# Patient Record
Sex: FEMALE | Race: BLACK OR AFRICAN AMERICAN
[De-identification: names, ages, dates, MRNs, and addresses within clinical notes are randomized per-mention and may not be internally consistent; named-entity substitution may affect disease eponyms.]

---

## 2017-10-05 ENCOUNTER — HOSPITAL ENCOUNTER (EMERGENCY)
Dept: HOSPITAL 61 - ER | Age: 37
LOS: 1 days | Discharge: HOME | End: 2017-10-06
Payer: COMMERCIAL

## 2017-10-05 VITALS — HEIGHT: 61 IN | WEIGHT: 198 LBS | BODY MASS INDEX: 37.38 KG/M2

## 2017-10-05 DIAGNOSIS — R10.9: Primary | ICD-10-CM

## 2017-10-05 DIAGNOSIS — K76.0: ICD-10-CM

## 2017-10-05 DIAGNOSIS — R30.0: ICD-10-CM

## 2017-10-05 DIAGNOSIS — E11.9: ICD-10-CM

## 2017-10-05 LAB
ALBUMIN SERPL-MCNC: 3.3 G/DL (ref 3.4–5)
ALBUMIN/GLOB SERPL: 0.8 {RATIO} (ref 1–1.7)
ALP SERPL-CCNC: 161 U/L (ref 46–116)
ALT SERPL-CCNC: 15 U/L (ref 14–59)
ANION GAP SERPL CALC-SCNC: 7 MMOL/L (ref 6–14)
AST SERPL-CCNC: 11 U/L (ref 15–37)
BACTERIA #/AREA URNS HPF: (no result) /HPF
BASOPHILS # BLD AUTO: 0.1 X10^3/UL (ref 0–0.2)
BASOPHILS NFR BLD: 1 % (ref 0–3)
BILIRUB SERPL-MCNC: 0.1 MG/DL (ref 0.2–1)
BILIRUB UR QL STRIP: NEGATIVE
BUN SERPL-MCNC: 7 MG/DL (ref 7–20)
BUN/CREAT SERPL: 9 (ref 6–20)
CALCIUM SERPL-MCNC: 8.7 MG/DL (ref 8.5–10.1)
CHLORIDE SERPL-SCNC: 100 MMOL/L (ref 98–107)
CO2 SERPL-SCNC: 27 MMOL/L (ref 21–32)
CREAT SERPL-MCNC: 0.8 MG/DL (ref 0.6–1)
EOSINOPHIL NFR BLD: 2 % (ref 0–3)
ERYTHROCYTE [DISTWIDTH] IN BLOOD BY AUTOMATED COUNT: 17.7 % (ref 11.5–14.5)
GFR SERPLBLD BASED ON 1.73 SQ M-ARVRAT: 97.1 ML/MIN
GLOBULIN SER-MCNC: 4.3 G/DL (ref 2.2–3.8)
GLUCOSE SERPL-MCNC: 386 MG/DL (ref 70–99)
GLUCOSE UR STRIP-MCNC: >=1000 MG/DL
HCT VFR BLD CALC: 31.4 % (ref 36–47)
HGB BLD-MCNC: 10.1 G/DL (ref 12–15.5)
LYMPHOCYTES # BLD: 2.2 X10^3/UL (ref 1–4.8)
LYMPHOCYTES NFR BLD AUTO: 28 % (ref 24–48)
MCH RBC QN AUTO: 25 PG (ref 25–35)
MCHC RBC AUTO-ENTMCNC: 32 G/DL (ref 31–37)
MCV RBC AUTO: 77 FL (ref 79–100)
MONOCYTES NFR BLD: 9 % (ref 0–9)
NEUTROPHILS NFR BLD AUTO: 61 % (ref 31–73)
NITRITE UR QL STRIP: NEGATIVE
PH UR STRIP: 5.5 [PH]
PLATELET # BLD AUTO: 267 X10^3/UL (ref 140–400)
POTASSIUM SERPL-SCNC: 3.9 MMOL/L (ref 3.5–5.1)
PROT SERPL-MCNC: 7.6 G/DL (ref 6.4–8.2)
PROT UR STRIP-MCNC: NEGATIVE MG/DL
RBC # BLD AUTO: 4.07 X10^6/UL (ref 3.5–5.4)
RBC #/AREA URNS HPF: 0 /HPF (ref 0–2)
SODIUM SERPL-SCNC: 134 MMOL/L (ref 136–145)
SP GR UR STRIP: >=1.03
SQUAMOUS #/AREA URNS LPF: (no result) /LPF
UROBILINOGEN UR-MCNC: 0.2 MG/DL
WBC # BLD AUTO: 8 X10^3/UL (ref 4–11)
WBC #/AREA URNS HPF: (no result) /HPF (ref 0–4)

## 2017-10-05 PROCEDURE — 85025 COMPLETE CBC W/AUTO DIFF WBC: CPT

## 2017-10-05 PROCEDURE — 81001 URINALYSIS AUTO W/SCOPE: CPT

## 2017-10-05 PROCEDURE — 36415 COLL VENOUS BLD VENIPUNCTURE: CPT

## 2017-10-05 PROCEDURE — 81025 URINE PREGNANCY TEST: CPT

## 2017-10-05 PROCEDURE — 76705 ECHO EXAM OF ABDOMEN: CPT

## 2017-10-05 PROCEDURE — 87086 URINE CULTURE/COLONY COUNT: CPT

## 2017-10-05 PROCEDURE — 99285 EMERGENCY DEPT VISIT HI MDM: CPT

## 2017-10-05 PROCEDURE — 80053 COMPREHEN METABOLIC PANEL: CPT

## 2017-10-05 PROCEDURE — 96374 THER/PROPH/DIAG INJ IV PUSH: CPT

## 2017-10-05 NOTE — PHYS DOC
Past Medical History


Past Medical History:  Bronchitis, Diabetes-Type II, Other


Additional Past Medical Histor:  HEART MURMUR, VIT D DEF,STRABISMUS


Past Surgical History:  , Tubal ligation, Other


Additional Past Surgical Histo:   x 3, bilateral eye surgery.


Alcohol Use:  None


Drug Use:  None





Adult General


Chief Complaint


Chief Complaint:  ABDOMINAL PAIN





HPI


HPI





Patient is a 37  year old female who presents with complaints of dysuria and 

mild abdominal pain in the middle of right side. Patient denies any fevers, 

chills, rashes, vomiting, diarrhea, vaginal discharge or vaginal bleeding. No 

history of trauma.





Review of Systems


Review of Systems





Constitutional: Denies fever or chills []


Eyes: Denies change in visual acuity, redness, or eye pain []


HENT: Denies nasal congestion or sore throat []


Respiratory: Denies cough or shortness of breath []


Cardiovascular: No chest pain


GI: Denies  nausea, vomiting, bloody stools or diarrhea. Yes right middle 

abdominal pain


: Denies dysuria or hematuria []


Musculoskeletal: Denies back pain or joint pain []


Integument: Denies rash or skin lesions []


Neurologic: Denies headache, focal weakness or sensory changes []





Current Medications


Current Medications





Current Medications








 Medications


  (Trade)  Dose


 Ordered  Sig/Robb  Start Time


 Stop Time Status Last Admin


Dose Admin


 


 Hyoscyamine


  (Anaspaz)  0.125 mg  PRN Q4HRS  PRN  10/5/17 22:15


    10/5/17 22:36


0.125 MG


 


 Ketorolac


 Tromethamine


  (Toradol)  15 mg  1X  ONCE  10/5/17 23:00


 10/5/17 23:01 DC 10/5/17 23:00


15 MG


 


 Naproxen


  (Naprosyn)  250 mg  1X  ONCE  10/5/17 22:30


 10/5/17 22:31 DC 10/5/17 22:36


250 MG











Allergies


Allergies





Allergies








Coded Allergies Type Severity Reaction Last Updated Verified


 


  No Known Drug Allergies    14 No











Physical Exam


Physical Exam





Constitutional: Well developed, well nourished, no acute distress, non-toxic 

appearance. []


HENT: Normocephalic, atraumatic, bilateral external ears normal, oropharynx dry

, no oral exudates, nose normal. []


Eyes:EOMI, conjunctiva normal, no discharge. [] 


Neck: Normal range of motion, no tenderness, trachea midline, no stridor. [] 


Cardiovascular:Heart rate regular rhythm, no murmur, equal pulses


Lungs & Thorax:  Bilateral breath sounds clear to auscultation, no tachypnea


Abdomen: Bowel sounds normal, soft, very mild tenderness middle or right 

abdomen without guarding or rebound, no masses, no pulsatile masses. [] 


Skin: Warm, dry, no erythema, no rash. [] 


Back: No tenderness, no CVA tenderness. [] 


Extremities: No tenderness, no cyanosis, no DVT, ROM intact, no edema. [] 


Neurologic: Alert and oriented X 3, normal motor function, , no focal deficits 

noted. []


Psychologic: Affect normal, judgement normal, mood normal. []





Current Patient Data


Vital Signs





 Vital Signs








  Date Time  Temp Pulse Resp B/P (MAP) Pulse Ox O2 Delivery O2 Flow Rate FiO2


 


10/6/17 00:29  57 16 99/52 (68) 95 Room Air  


 


10/5/17 21:25 98.6       





 98.6       








Lab Values





 Laboratory Tests








Test


  10/5/17


20:22 10/5/17


21:33 10/5/17


21:58


 


Urine Collection Type Unknown    


 


Urine Color Yellow    


 


Urine Clarity Clear    


 


Urine pH 5.5    


 


Urine Specific Gravity >=1.030    


 


Urine Protein


  Negative mg/dL


(NEG-TRACE) 


  


 


 


Urine Glucose (UA)


  >=1000 mg/dL


(NEG) 


  


 


 


Urine Ketones (Stick)


  Negative mg/dL


(NEG) 


  


 


 


Urine Blood


  Negative (NEG)


  


  


 


 


Urine Nitrite


  Negative (NEG)


  


  


 


 


Urine Bilirubin


  Negative (NEG)


  


  


 


 


Urine Urobilinogen Dipstick


  0.2 mg/dL (0.2


mg/dL) 


  


 


 


Urine Leukocyte Esterase


  Negative (NEG)


  


  


 


 


Urine RBC 0 /HPF (0-2)    


 


Urine WBC


  5-10 /HPF


(0-4) 


  


 


 


Urine Squamous Epithelial


Cells Mod /LPF  


  


  


 


 


Urine Bacteria


  Few /HPF


(0-FEW) 


  


 


 


POC Urine HCG, Qualitative


  


  Hcg negative


(Negative) 


 


 


White Blood Count


  


  


  8.0 x10^3/uL


(4.0-11.0)


 


Red Blood Count


  


  


  4.07 x10^6/uL


(3.50-5.40)


 


Hemoglobin


  


  


  10.1 g/dL


(12.0-15.5)  L


 


Hematocrit


  


  


  31.4 %


(36.0-47.0)  L


 


Mean Corpuscular Volume


  


  


  77 fL ()


L


 


Mean Corpuscular Hemoglobin   25 pg (25-35)  


 


Mean Corpuscular Hemoglobin


Concent 


  


  32 g/dL


(31-37)


 


Red Cell Distribution Width


  


  


  17.7 %


(11.5-14.5)  H


 


Platelet Count


  


  


  267 x10^3/uL


(140-400)


 


Neutrophils (%) (Auto)   61 % (31-73)  


 


Lymphocytes (%) (Auto)   28 % (24-48)  


 


Monocytes (%) (Auto)   9 % (0-9)  


 


Eosinophils (%) (Auto)   2 % (0-3)  


 


Basophils (%) (Auto)   1 % (0-3)  


 


Neutrophils # (Auto)


  


  


  4.9 x10^3uL


(1.8-7.7)


 


Lymphocytes # (Auto)


  


  


  2.2 x10^3/uL


(1.0-4.8)


 


Monocytes # (Auto)


  


  


  0.7 x10^3/uL


(0.0-1.1)


 


Eosinophils # (Auto)


  


  


  0.2 x10^3/uL


(0.0-0.7)


 


Basophils # (Auto)


  


  


  0.1 x10^3/uL


(0.0-0.2)


 


Sodium Level


  


  


  134 mmol/L


(136-145)  L


 


Potassium Level


  


  


  3.9 mmol/L


(3.5-5.1)


 


Chloride Level


  


  


  100 mmol/L


()


 


Carbon Dioxide Level


  


  


  27 mmol/L


(21-32)


 


Anion Gap   7 (6-14)  


 


Blood Urea Nitrogen


  


  


  7 mg/dL (7-20)


 


 


Creatinine


  


  


  0.8 mg/dL


(0.6-1.0)


 


Estimated GFR


(Cockcroft-Gault) 


  


  97.1  


 


 


BUN/Creatinine Ratio   9 (6-20)  


 


Glucose Level


  


  


  386 mg/dL


(70-99)  H


 


Calcium Level


  


  


  8.7 mg/dL


(8.5-10.1)


 


Total Bilirubin


  


  


  0.1 mg/dL


(0.2-1.0)  L


 


Aspartate Amino Transferase


(AST) 


  


  11 U/L (15-37)


L


 


Alanine Aminotransferase (ALT)


  


  


  15 U/L (14-59)


 


 


Alkaline Phosphatase


  


  


  161 U/L


()  H


 


Total Protein


  


  


  7.6 g/dL


(6.4-8.2)


 


Albumin


  


  


  3.3 g/dL


(3.4-5.0)  L


 


Albumin/Globulin Ratio


  


  


  0.8 (1.0-1.7)


L





 Laboratory Tests


10/5/17 21:58








 Laboratory Tests


10/5/17 21:58











EKG


EKG


[]





Radiology/Procedures


Radiology/Procedures


US: Fatty liver, no other acute findings[]


IMPRESSION:


 


Enlarged fatty liver. Contracted gallbladder with no choleliths, sludge or


wall thickening.





Course & Med Decision Making


Course & Med Decision Making


Pertinent Labs and Imaging studies reviewed. (See chart for details) desire all 

been discussed with the patient. Patient has been advised to follow-up with her 

doctor for recheck and reevaluation in 2 days.





[]





Dragon Disclaimer


Dragon Disclaimer


This electronic medical record was generated, in whole or in part, using a 

voice recognition dictation system.





Departure


Departure


Impression:  


 Primary Impression:  


 Abdominal pain


 Additional Impression:  


 Fatty liver


Disposition:   HOME, SELF-CARE


Condition:  STABLE


Referrals:  


CODIE STANFORD (PCP)


Follow-up for recheck and reevaluation 2 days


Patient Instructions:  Abdominal Pain (Nonspecific)


Scripts


Hyoscyamine Sulfate (LEVSIN) 0.125 Mg Tablet


1 TAB PO TID for 5 Days, #15 TAB 1 Refill


   Prov: SHANNON TRAVIS MD         10/6/17





Problem Qualifiers











SHANNON TRAVIS MD Oct 5, 2017 21:28

## 2017-10-06 VITALS — SYSTOLIC BLOOD PRESSURE: 112 MMHG | DIASTOLIC BLOOD PRESSURE: 53 MMHG

## 2017-10-06 NOTE — RAD
Abdominal ultrasound right upper quadrant:

 

Reason for examination: Right upper quadrant abdominal pain with nausea. 

Nothing by mouth 6.5 to 7 hours.

 

The pancreas is poorly visualized due to bowel gas. Visualized portion of 

the inferior vena cava shows no abnormality. The liver appears to be 

enlarged at 20.6 cm with fatty infiltration. There appears to be normal 

hepatopetal blood flow in the portal venous system. The right kidney 

measures 11.3 x 4.9 x 4.3 cm in greatest dimension and shows normal 

cortical medullary differentiation and good blood flow with no mass or 

hydronephrosis. The gallbladder is contracted with no cholelithiasis or 

wall thickening evident. Common bile duct is normal at 5.3 mm.

 

IMPRESSION:

 

Enlarged fatty liver. Contracted gallbladder with no choleliths, sludge or

wall thickening.

 

Electronically signed by: Jo-Ann Deras MD (10/6/2017 1:00 AM) [facility]

## 2018-02-12 ENCOUNTER — HOSPITAL ENCOUNTER (EMERGENCY)
Dept: HOSPITAL 61 - ER | Age: 38
Discharge: HOME | End: 2018-02-12
Payer: COMMERCIAL

## 2018-02-12 DIAGNOSIS — Z88.2: ICD-10-CM

## 2018-02-12 DIAGNOSIS — Z88.1: ICD-10-CM

## 2018-02-12 DIAGNOSIS — Z91.14: Primary | ICD-10-CM

## 2018-02-12 DIAGNOSIS — E11.65: ICD-10-CM

## 2018-02-12 DIAGNOSIS — F17.210: ICD-10-CM

## 2018-02-12 LAB
ACETONE: (no result)
ADD MAN DIFF?: NO
ALBUMIN SERPL-MCNC: 3.4 G/DL (ref 3.4–5)
ALBUMIN/GLOB SERPL: 0.7 {RATIO} (ref 1–1.7)
ALP SERPL-CCNC: 234 U/L (ref 46–116)
ALT (SGPT): 14 U/L (ref 14–59)
ANION GAP SERPL CALC-SCNC: 10 MMOL/L (ref 6–14)
AST SERPL-CCNC: 12 U/L (ref 15–37)
BASO #: 0.1 X10^3/UL (ref 0–0.2)
BASO %: 1 % (ref 0–3)
BLOOD UREA NITROGEN: 12 MG/DL (ref 7–20)
BUN/CREAT SERPL: 15 (ref 6–20)
CALCIUM: 9.6 MG/DL (ref 8.5–10.1)
CHLORIDE: 96 MMOL/L (ref 98–107)
CO2 SERPL-SCNC: 26 MMOL/L (ref 21–32)
CREAT SERPL-MCNC: 0.8 MG/DL (ref 0.6–1)
EOS #: 0.1 X10^3/UL (ref 0–0.7)
EOS %: 1 % (ref 0–3)
GFR SERPLBLD BASED ON 1.73 SQ M-ARVRAT: 97.1 ML/MIN
GLOBULIN SER-MCNC: 5.2 G/DL (ref 2.2–3.8)
GLUCOSE SERPL-MCNC: 542 MG/DL (ref 70–99)
HCG SERPL-ACNC: 5.8 X10^3/UL (ref 4–11)
HEMATOCRIT: 37 % (ref 36–47)
HEMOGLOBIN: 11.8 G/DL (ref 12–15.5)
LIPASE: 103 U/L (ref 73–393)
LYMPH #: 1.5 X10^3/UL (ref 1–4.8)
LYMPH %: 26 % (ref 24–48)
MEAN CORPUSCULAR HEMOGLOBIN: 25 PG (ref 25–35)
MEAN CORPUSCULAR HGB CONC: 32 G/DL (ref 31–37)
MEAN CORPUSCULAR VOLUME: 80 FL (ref 79–100)
MONO #: 0.5 X10^3/UL (ref 0–1.1)
MONO %: 8 % (ref 0–9)
NEG OBC SER: (no result)
NEUT #: 3.7 X10^3UL (ref 1.8–7.7)
NEUT %: 63 % (ref 31–73)
PLATELET COUNT: 289 X10^3/UL (ref 140–400)
POC GLUCOSE: 331 MG/DL (ref 70–99)
POC GLUCOSE: 397 MG/DL (ref 70–99)
POC GLUCOSE: 515 MG/DL (ref 70–99)
POS OBC SER: (no result)
POTASSIUM SERPL-SCNC: 4.3 MMOL/L (ref 3.5–5.1)
PREG TEST PT QUAL: NEGATIVE
RED BLOOD COUNT: 4.65 X10^6/UL (ref 3.5–5.4)
RED CELL DISTRIBUTION WIDTH: 17.4 % (ref 11.5–14.5)
SODIUM: 132 MMOL/L (ref 136–145)
TOTAL BILIRUBIN: 0.2 MG/DL (ref 0.2–1)
TOTAL PROTEIN: 8.6 G/DL (ref 6.4–8.2)
URINE HCG POC: (no result)

## 2018-02-12 PROCEDURE — 81025 URINE PREGNANCY TEST: CPT

## 2018-02-12 PROCEDURE — 82010 KETONE BODYS QUAN: CPT

## 2018-02-12 PROCEDURE — 80053 COMPREHEN METABOLIC PANEL: CPT

## 2018-02-12 PROCEDURE — 36415 COLL VENOUS BLD VENIPUNCTURE: CPT

## 2018-02-12 PROCEDURE — 96372 THER/PROPH/DIAG INJ SC/IM: CPT

## 2018-02-12 PROCEDURE — 99284 EMERGENCY DEPT VISIT MOD MDM: CPT

## 2018-02-12 PROCEDURE — 84703 CHORIONIC GONADOTROPIN ASSAY: CPT

## 2018-02-12 PROCEDURE — 96374 THER/PROPH/DIAG INJ IV PUSH: CPT

## 2018-02-12 PROCEDURE — 96361 HYDRATE IV INFUSION ADD-ON: CPT

## 2018-02-12 PROCEDURE — 83690 ASSAY OF LIPASE: CPT

## 2018-02-12 PROCEDURE — 82962 GLUCOSE BLOOD TEST: CPT

## 2018-02-12 PROCEDURE — 85025 COMPLETE CBC W/AUTO DIFF WBC: CPT

## 2018-02-12 RX ADMIN — BACITRACIN 1 MLS/HR: 5000 INJECTION, POWDER, FOR SOLUTION INTRAMUSCULAR at 14:14

## 2018-02-12 RX ADMIN — BACITRACIN 1 MLS/HR: 5000 INJECTION, POWDER, FOR SOLUTION INTRAMUSCULAR at 14:15

## 2018-02-12 RX ADMIN — BACITRACIN 1 MLS/HR: 5000 INJECTION, POWDER, FOR SOLUTION INTRAMUSCULAR at 12:21

## 2018-02-12 RX ADMIN — FAMOTIDINE 1 MG: 10 INJECTION, SOLUTION INTRAVENOUS at 12:20

## 2018-03-20 ENCOUNTER — HOSPITAL ENCOUNTER (EMERGENCY)
Dept: HOSPITAL 61 - ER | Age: 38
Discharge: HOME | End: 2018-03-20
Payer: COMMERCIAL

## 2018-03-20 DIAGNOSIS — Z98.51: ICD-10-CM

## 2018-03-20 DIAGNOSIS — Z90.49: ICD-10-CM

## 2018-03-20 DIAGNOSIS — E11.9: ICD-10-CM

## 2018-03-20 DIAGNOSIS — R09.81: ICD-10-CM

## 2018-03-20 DIAGNOSIS — R05: ICD-10-CM

## 2018-03-20 DIAGNOSIS — Z88.2: ICD-10-CM

## 2018-03-20 DIAGNOSIS — K21.9: ICD-10-CM

## 2018-03-20 DIAGNOSIS — R07.89: Primary | ICD-10-CM

## 2018-03-20 DIAGNOSIS — Z88.1: ICD-10-CM

## 2018-03-20 LAB
ADD MAN DIFF?: NO
ALBUMIN SERPL-MCNC: 2.9 G/DL (ref 3.4–5)
ALBUMIN/GLOB SERPL: 0.7 {RATIO} (ref 1–1.7)
ALP SERPL-CCNC: 152 U/L (ref 46–116)
ALT (SGPT): 16 U/L (ref 14–59)
ANION GAP SERPL CALC-SCNC: 9 MMOL/L (ref 6–14)
AST SERPL-CCNC: 19 U/L (ref 15–37)
BASO #: 0 X10^3/UL (ref 0–0.2)
BASO %: 1 % (ref 0–3)
BLOOD UREA NITROGEN: 9 MG/DL (ref 7–20)
BUN/CREAT SERPL: 13 (ref 6–20)
CALCIUM: 8.5 MG/DL (ref 8.5–10.1)
CHLORIDE: 100 MMOL/L (ref 98–107)
CO2 SERPL-SCNC: 28 MMOL/L (ref 21–32)
CREAT SERPL-MCNC: 0.7 MG/DL (ref 0.6–1)
EOS #: 0.1 X10^3/UL (ref 0–0.7)
EOS %: 2 % (ref 0–3)
GFR SERPLBLD BASED ON 1.73 SQ M-ARVRAT: 112.7 ML/MIN
GLOBULIN SER-MCNC: 4.3 G/DL (ref 2.2–3.8)
GLUCOSE SERPL-MCNC: 326 MG/DL (ref 70–99)
HCG SERPL-ACNC: 5.1 X10^3/UL (ref 4–11)
HEMATOCRIT: 32 % (ref 36–47)
HEMOGLOBIN: 10.4 G/DL (ref 12–15.5)
LYMPH #: 1.6 X10^3/UL (ref 1–4.8)
LYMPH %: 30 % (ref 24–48)
MEAN CORPUSCULAR HEMOGLOBIN: 26 PG (ref 25–35)
MEAN CORPUSCULAR HGB CONC: 32 G/DL (ref 31–37)
MEAN CORPUSCULAR VOLUME: 79 FL (ref 79–100)
MONO #: 0.5 X10^3/UL (ref 0–1.1)
MONO %: 10 % (ref 0–9)
NEG OBC SER: (no result)
NEUT #: 2.9 X10^3UL (ref 1.8–7.7)
NEUT %: 57 % (ref 31–73)
PLATELET COUNT: 279 X10^3/UL (ref 140–400)
POS OBC SER: (no result)
POTASSIUM SERPL-SCNC: 3.6 MMOL/L (ref 3.5–5.1)
PREG TEST PT QUAL: NEGATIVE
RED BLOOD COUNT: 4.04 X10^6/UL (ref 3.5–5.4)
RED CELL DISTRIBUTION WIDTH: 18.5 % (ref 11.5–14.5)
SODIUM: 137 MMOL/L (ref 136–145)
TOTAL BILIRUBIN: 0.2 MG/DL (ref 0.2–1)
TOTAL PROTEIN: 7.2 G/DL (ref 6.4–8.2)
TROPONINI: < 0.017 NG/ML (ref 0–0.06)
URINE HCG POC: (no result)

## 2018-03-20 PROCEDURE — 71045 X-RAY EXAM CHEST 1 VIEW: CPT

## 2018-03-20 PROCEDURE — 36415 COLL VENOUS BLD VENIPUNCTURE: CPT

## 2018-03-20 PROCEDURE — 99285 EMERGENCY DEPT VISIT HI MDM: CPT

## 2018-03-20 PROCEDURE — 80053 COMPREHEN METABOLIC PANEL: CPT

## 2018-03-20 PROCEDURE — 93005 ELECTROCARDIOGRAM TRACING: CPT

## 2018-03-20 PROCEDURE — 84703 CHORIONIC GONADOTROPIN ASSAY: CPT

## 2018-03-20 PROCEDURE — 81025 URINE PREGNANCY TEST: CPT

## 2018-03-20 PROCEDURE — 85025 COMPLETE CBC W/AUTO DIFF WBC: CPT

## 2018-03-20 PROCEDURE — 84484 ASSAY OF TROPONIN QUANT: CPT

## 2018-03-20 RX ADMIN — FAMOTIDINE 1 MG: 20 TABLET ORAL at 11:03

## 2018-03-20 RX ADMIN — Medication 1 ML: at 11:03

## 2019-02-24 ENCOUNTER — HOSPITAL ENCOUNTER (EMERGENCY)
Dept: HOSPITAL 61 - ER | Age: 39
Discharge: HOME | End: 2019-02-24
Payer: SELF-PAY

## 2019-02-24 VITALS — DIASTOLIC BLOOD PRESSURE: 87 MMHG | SYSTOLIC BLOOD PRESSURE: 136 MMHG

## 2019-02-24 VITALS — WEIGHT: 177 LBS | BODY MASS INDEX: 32.57 KG/M2 | HEIGHT: 62 IN

## 2019-02-24 DIAGNOSIS — Z88.2: ICD-10-CM

## 2019-02-24 DIAGNOSIS — N39.0: Primary | ICD-10-CM

## 2019-02-24 DIAGNOSIS — E11.9: ICD-10-CM

## 2019-02-24 DIAGNOSIS — Z88.1: ICD-10-CM

## 2019-02-24 DIAGNOSIS — Z98.51: ICD-10-CM

## 2019-02-24 LAB
APTT PPP: (no result) S
APTT PPP: YELLOW S
BACTERIA #/AREA URNS HPF: (no result) /HPF
BACTERIA #/AREA URNS HPF: (no result) /HPF
BILIRUB UR QL STRIP: (no result)
BILIRUB UR QL STRIP: NEGATIVE
FIBRINOGEN PPP-MCNC: (no result) MG/DL
FIBRINOGEN PPP-MCNC: (no result) MG/DL
NITRITE UR QL STRIP: (no result)
NITRITE UR QL STRIP: NEGATIVE
PH UR STRIP: (no result) [PH]
PH UR STRIP: 6 [PH]
PROT UR STRIP-MCNC: (no result) MG/DL
PROT UR STRIP-MCNC: 100 MG/DL
RBC #/AREA URNS HPF: (no result) /HPF (ref 0–2)
SQUAMOUS #/AREA URNS LPF: (no result) /LPF
SQUAMOUS #/AREA URNS LPF: (no result) /LPF
U PREG PATIENT: NEGATIVE
UROBILINOGEN UR-MCNC: (no result) MG/DL
UROBILINOGEN UR-MCNC: 1 MG/DL
WBC #/AREA URNS HPF: (no result) /HPF (ref 0–4)
WBC #/AREA URNS HPF: (no result) /HPF (ref 0–4)

## 2019-02-24 PROCEDURE — 87086 URINE CULTURE/COLONY COUNT: CPT

## 2019-02-24 PROCEDURE — 99283 EMERGENCY DEPT VISIT LOW MDM: CPT

## 2019-02-24 PROCEDURE — 81001 URINALYSIS AUTO W/SCOPE: CPT

## 2019-02-24 PROCEDURE — 81025 URINE PREGNANCY TEST: CPT

## 2019-02-24 PROCEDURE — 87186 SC STD MICRODIL/AGAR DIL: CPT

## 2019-02-24 NOTE — PHYS DOC
Past Medical History


Past Medical History:  Bronchitis, Depression, Diabetes-Type II


Additional Past Medical Histor:  vitamin D deficency


Past Surgical History:  , Tubal ligation


Additional Past Surgical Histo:  eye surgery (strabismus correction)


Alcohol Use:  None


Drug Use:  None





Adult General


Chief Complaint


Chief Complaint:  PAIN ON URINATION





HPI


HPI





Patient is a 38  year old female who presents with dysuria, urinary frequency 

and urgency the past 24 hours. Patient was reports hematuria she is on her 

menstrual period. No flank pain, and no fever chills, nausea vomiting or 

sweats. No other acute symptoms or complaints. History of recurrent urinary 

tract infections. []





Review of Systems


Review of Systems


Symptoms as per history of present illness. All other review symptoms are 

negative.





All other systems were reviewed and found to be within normal limits, except as 

documented in this note.





Current Medications


Current Medications





Current Medications








 Medications


  (Trade)  Dose


 Ordered  Sig/Robb  Start Time


 Stop Time Status Last Admin


Dose Admin


 


 Nitrofurantoin


 Macrocrystals


  (Macrobid)  100 mg  1X  ONCE  19 02:00


 19 02:01   





 


 Phenazopyridine


 HCl


  (Pyridium)  200 mg  1X  ONCE  19 01:30


 19 01:31 DC 19 01:30


200 MG











Allergies


Allergies





Allergies








Coded Allergies Type Severity Reaction Last Updated Verified


 


  sulfamethoxazole Adverse Reaction Intermediate nausea and vomiting 18 Yes


 


  trimethoprim Adverse Reaction Intermediate nausea and vomiting 18 Yes











Physical Exam


Physical Exam





Constitutional: Well developed, well nourished, no acute distress, non-toxic 

appearance. []


HENT: Normocephalic, atraumatic, bilateral external ears normal, oropharynx 

moist, nose normal. []


Eyes: PERRLA, EOMI, conjunctiva normal, no discharge. [] 


Lungs & Thorax:  Bilateral breath sounds clear to auscultation []


Abdomen: Bowel sounds normal, soft, suprapubic pain, tenderness. [] 


Skin: Warm, dry, no erythema, no rash. [] 


Back: No tenderness, no CVA tenderness. [] 


Extremities: No tenderness, no cyanosis, no clubbing, ROM intact, no edema. [] 


Neurologic: Alert and oriented X 3. []


Psychologic: Affect normal, judgement normal, mood normal. []





Current Patient Data


Vital Signs





 Vital Signs








  Date Time  Temp Pulse Resp B/P (MAP) Pulse Ox O2 Delivery O2 Flow Rate FiO2


 


19 00:28 97.4 96 18 136/87 (103) 98 Room Air  





 97.4       








Lab Values





 Laboratory Tests








Test


 19


00:15 19


00:58


 


Urine Collection Type Unknown   U cath  


 


Urine Color Red   Yellow  


 


Urine Clarity Turbid   Cloudy  


 


Urine pH    6.0  


 


Urine Specific Gravity >=1.030   >=1.030  


 


Urine Protein


 mg/dL


(NEG-TRACE) 100 mg/dL


(NEG-TRACE)


 


Urine Glucose (UA)


  mg/dL (NEG)  


 >=1000 mg/dL


(NEG)


 


Urine Ketones (Stick)


  mg/dL (NEG)  


 Negative mg/dL


(NEG)


 


Urine Blood  (NEG)   Large (NEG)  


 


Urine Nitrite


  (NEG)  


 Negative (NEG)





 


Urine Bilirubin


  (NEG)  


 Negative (NEG)





 


Urine Urobilinogen Dipstick


 mg/dL (0.2


mg/dL) 1.0 mg/dL (0.2


mg/dL)


 


Urine Leukocyte Esterase  (NEG)   Small (NEG)  


 


Urine RBC


 Tntc /HPF


(0-2) 11-20 /HPF


(0-2)


 


Urine WBC


 20-40 /HPF


(0-4) 20-40 /HPF


(0-4)


 


Urine Squamous Epithelial


Cells Few /LPF  


 Few /LPF  





 


Urine Bacteria


 Few /HPF


(0-FEW) Few /HPF


(0-FEW)


 


Urine Pregnancy Test


 Negative (NEG)


 





 


Urine Mucus  Slight /LPF  











EKG


EKG


[]





Radiology/Procedures


Radiology/Procedures


[]





Course & Med Decision Making


Course & Med Decision Making


Pertinent Labs and Imaging studies reviewed. (See chart for details)





[First dose of antibiotics given. Recommend PCP follow-up]





Dragon Disclaimer


Dragon Disclaimer


This electronic medical record was generated, in whole or in part, using a 

voice recognition dictation system.





Departure


Departure


Impression:  


 Primary Impression:  


 UTI (urinary tract infection)


Disposition:  01 HOME, SELF-CARE


Condition:  GOOD


Patient Instructions:  Urinary Tract Infection, Easy-to-Read





Additional Instructions:  


Please take antibiotics as directed and Pyridium for pain relief. Follow-up 

with your PCP in 2-3 days for urine culture results. Return to the ED if new or 

worsening symptoms.


Scripts


Nitrofurantoin Macrocrystal (MACRODANTIN) 100 Mg Capsule


1 CAP PO BID, #20 CAP


   Prov: MARKUS LYN DO         19 


Phenazopyridine Hcl (PYRIDIUM) 200 Mg Tablet


200 MG PO TID, #6 TAB


   Prov: MARKUS LYN DO         19











MARKUS LYN DO 2019 01:56

## 2019-06-02 ENCOUNTER — HOSPITAL ENCOUNTER (EMERGENCY)
Dept: HOSPITAL 61 - ER | Age: 39
LOS: 1 days | Discharge: HOME | End: 2019-06-03
Payer: COMMERCIAL

## 2019-06-02 VITALS — HEIGHT: 62 IN | BODY MASS INDEX: 30.91 KG/M2 | WEIGHT: 168 LBS

## 2019-06-02 DIAGNOSIS — N39.0: Primary | ICD-10-CM

## 2019-06-02 DIAGNOSIS — Z88.1: ICD-10-CM

## 2019-06-02 DIAGNOSIS — Z98.51: ICD-10-CM

## 2019-06-02 DIAGNOSIS — Z98.890: ICD-10-CM

## 2019-06-02 DIAGNOSIS — F32.9: ICD-10-CM

## 2019-06-02 DIAGNOSIS — E11.9: ICD-10-CM

## 2019-06-02 DIAGNOSIS — R20.0: ICD-10-CM

## 2019-06-02 DIAGNOSIS — Z88.2: ICD-10-CM

## 2019-06-02 PROCEDURE — 85025 COMPLETE CBC W/AUTO DIFF WBC: CPT

## 2019-06-02 PROCEDURE — 70450 CT HEAD/BRAIN W/O DYE: CPT

## 2019-06-02 PROCEDURE — 81025 URINE PREGNANCY TEST: CPT

## 2019-06-02 PROCEDURE — 80053 COMPREHEN METABOLIC PANEL: CPT

## 2019-06-02 PROCEDURE — 81001 URINALYSIS AUTO W/SCOPE: CPT

## 2019-06-02 PROCEDURE — 87186 SC STD MICRODIL/AGAR DIL: CPT

## 2019-06-02 PROCEDURE — 36415 COLL VENOUS BLD VENIPUNCTURE: CPT

## 2019-06-02 PROCEDURE — 87086 URINE CULTURE/COLONY COUNT: CPT

## 2019-06-02 PROCEDURE — 93005 ELECTROCARDIOGRAM TRACING: CPT

## 2019-06-02 PROCEDURE — 84484 ASSAY OF TROPONIN QUANT: CPT

## 2019-06-02 PROCEDURE — 83735 ASSAY OF MAGNESIUM: CPT

## 2019-06-03 VITALS — DIASTOLIC BLOOD PRESSURE: 55 MMHG | SYSTOLIC BLOOD PRESSURE: 107 MMHG

## 2019-06-03 LAB
ALBUMIN SERPL-MCNC: 3.5 G/DL (ref 3.4–5)
ALBUMIN/GLOB SERPL: 0.8 {RATIO} (ref 1–1.7)
ALP SERPL-CCNC: 174 U/L (ref 46–116)
ALT SERPL-CCNC: 15 U/L (ref 14–59)
ANION GAP SERPL CALC-SCNC: 8 MMOL/L (ref 6–14)
APTT PPP: YELLOW S
AST SERPL-CCNC: 14 U/L (ref 15–37)
BACTERIA #/AREA URNS HPF: (no result) /HPF
BASOPHILS # BLD AUTO: 0.1 X10^3/UL (ref 0–0.2)
BASOPHILS NFR BLD: 1 % (ref 0–3)
BILIRUB SERPL-MCNC: 0.2 MG/DL (ref 0.2–1)
BILIRUB UR QL STRIP: NEGATIVE
BUN SERPL-MCNC: 8 MG/DL (ref 7–20)
BUN/CREAT SERPL: 11 (ref 6–20)
CALCIUM SERPL-MCNC: 8.9 MG/DL (ref 8.5–10.1)
CHLORIDE SERPL-SCNC: 97 MMOL/L (ref 98–107)
CO2 SERPL-SCNC: 29 MMOL/L (ref 21–32)
CREAT SERPL-MCNC: 0.7 MG/DL (ref 0.6–1)
EOSINOPHIL NFR BLD: 0.1 X10^3/UL (ref 0–0.7)
EOSINOPHIL NFR BLD: 1 % (ref 0–3)
ERYTHROCYTE [DISTWIDTH] IN BLOOD BY AUTOMATED COUNT: 15.7 % (ref 11.5–14.5)
FIBRINOGEN PPP-MCNC: CLEAR MG/DL
GFR SERPLBLD BASED ON 1.73 SQ M-ARVRAT: 112.1 ML/MIN
GLOBULIN SER-MCNC: 4.4 G/DL (ref 2.2–3.8)
GLUCOSE SERPL-MCNC: 361 MG/DL (ref 70–99)
HCT VFR BLD CALC: 33.3 % (ref 36–47)
HGB BLD-MCNC: 10.6 G/DL (ref 12–15.5)
LYMPHOCYTES # BLD: 2.6 X10^3/UL (ref 1–4.8)
LYMPHOCYTES NFR BLD AUTO: 35 % (ref 24–48)
MAGNESIUM SERPL-MCNC: 1.9 MG/DL (ref 1.8–2.4)
MCH RBC QN AUTO: 24 PG (ref 25–35)
MCHC RBC AUTO-ENTMCNC: 32 G/DL (ref 31–37)
MCV RBC AUTO: 76 FL (ref 79–100)
MONO #: 1.2 X10^3/UL (ref 0–1.1)
MONOCYTES NFR BLD: 16 % (ref 0–9)
NEUT #: 3.7 X10^3UL (ref 1.8–7.7)
NEUTROPHILS NFR BLD AUTO: 48 % (ref 31–73)
NITRITE UR QL STRIP: POSITIVE
PH UR STRIP: 7 [PH]
PLATELET # BLD AUTO: 243 X10^3/UL (ref 140–400)
POTASSIUM SERPL-SCNC: 4.3 MMOL/L (ref 3.5–5.1)
PROT SERPL-MCNC: 7.9 G/DL (ref 6.4–8.2)
PROT UR STRIP-MCNC: NEGATIVE MG/DL
RBC # BLD AUTO: 4.36 X10^6/UL (ref 3.5–5.4)
RBC #/AREA URNS HPF: (no result) /HPF (ref 0–2)
SODIUM SERPL-SCNC: 134 MMOL/L (ref 136–145)
SQUAMOUS #/AREA URNS LPF: (no result) /LPF
UROBILINOGEN UR-MCNC: 1 MG/DL
WBC # BLD AUTO: 7.7 X10^3/UL (ref 4–11)
WBC #/AREA URNS HPF: (no result) /HPF (ref 0–4)

## 2019-06-03 NOTE — RAD
Cristal Townsendren

INDICATION: Numbness of scalp and face

 

COMPARISON: None.

 

TECHNIQUE: 

 

Axial CT images obtained through the head without intravenous contrast.

 

One or more of the following individualized dose reduction techniques were

utilized for this examination:  1. Automated exposure control;  2. 

Adjustment of the mA and/or kV according to patient size;  3. Use of 

iterative reconstruction technique.

 

FINDINGS:

 

No intracranial hemorrhage.

No midline shift.  Basal cisterns patent.

Ventricles and sulci are unremarkable.

No acute osseous abnormality.

Orbits and paranasal sinuses unremarkable.

 

IMPRESSION:

 

1.   No acute intracranial hemorrhage.

 

Electronically signed by: Kaushik Man MD (6/3/2019 11:50 PM) [facility]

## 2019-06-03 NOTE — PHYS DOC
Past Medical History


Past Medical History:  Bronchitis, Depression, Diabetes-Type II


Additional Past Medical Histor:  vitamin D deficency


Past Surgical History:  , Tubal ligation


Additional Past Surgical Histo:  eye surgery (strabismus correction)


Alcohol Use:  None


Drug Use:  None





Adult General


Chief Complaint


Chief Complaint:  MULTIPLE COMPLAINTS





HPI


HPI





Patient is a 39  year old f with cc of scalp numnbess. right side x one day. no 

trauma noted.


also has intermittent numbness of the b/l legs and hands comes and goes thinks 

that is from diabetes


no headche no speech problem, no facial symptoms.


no fever.


tries to be comlpiant with her medications


symptoms modeating nonradiating.





Review of Systems


Review of Systems





Constitutional: Denies fever or chills []


Eyes: Denies change in visual acuity, redness, or eye pain []


HENT: Denies nasal congestion or sore throat []


Respiratory: Denies cough or shortness of breath []





Musculoskeletal: Denies back pain or joint pain []








All other systems were reviewed and found to be within normal limits, except as 

documented in this note.





Allergies


Allergies





Allergies








Coded Allergies Type Severity Reaction Last Updated Verified


 


  sulfamethoxazole Adverse Reaction Intermediate nausea and vomiting 18 Yes


 


  trimethoprim Adverse Reaction Intermediate nausea and vomiting 18 Yes











Physical Exam


Physical Exam





Constitutional: Well developed, well nourished, no acute distress, non-toxic 

appearance. []


HENT: Normocephalic, atraumatic, bilateral external ears normal, oropharynx 

moist, no oral exudates, nose normal. []


Eyes: PERRLA, EOMI, conjunctiva normal, no discharge. [] 


Neck: Normal range of motion, no tenderness, supple, no stridor. [] 


Cardiovascular:Heart rate regular rhythm, no murmur []


Lungs & Thorax:  Bilateral breath sounds clear to auscultation []


Abdomen: Bowel sounds normal, soft, no tenderness, no masses, no pulsatile 

masses. [] 


Skin: Warm, dry, no erythema, no rash. [] no obvious scalp lesion noted


Back: No tenderness, no CVA tenderness. [] 


Extremities: No tenderness, no cyanosis, no clubbing, ROM intact, no edema. [] 


Neurologic: Alert and oriented X 3, normal motor function, normal sensory fu

nction, no focal deficits noted. [] sensation and motor function intact, cn's 

intact


Psychologic: Affect normal, judgement normal, mood normal. []





Current Patient Data


Vital Signs





                                   Vital Signs








  Date Time  Temp Pulse Resp B/P (MAP) Pulse Ox O2 Delivery O2 Flow Rate FiO2


 


6/3/19 00:34  78 18 115/70 (85) 99 Room Air  


 


19 23:50 98.4       





 98.4       








Lab Values





                                Laboratory Tests








Test


 6/3/19


00:01 6/3/19


00:20


 


Urine Collection Type Unknown   


 


Urine Color Yellow   


 


Urine Clarity Clear   


 


Urine pH 7.0   


 


Urine Specific Gravity >=1.030   


 


Urine Protein


 Negative mg/dL


(NEG-TRACE) 





 


Urine Glucose (UA)


 >=1000 mg/dL


(NEG) 





 


Urine Ketones (Stick)


 Negative mg/dL


(NEG) 





 


Urine Blood


 Negative (NEG)


 





 


Urine Nitrite


 Positive (NEG)


 





 


Urine Bilirubin


 Negative (NEG)


 





 


Urine Urobilinogen Dipstick


 1.0 mg/dL (0.2


mg/dL) 





 


Urine Leukocyte Esterase Small (NEG)   


 


Urine RBC


 Occ /HPF (0-2)


 





 


Urine WBC


 Tntc /HPF


(0-4) 





 


Urine Squamous Epithelial


Cells Mod /LPF  


 





 


Urine Bacteria


 Many /HPF


(0-FEW) 





 


Urine Mucus Slight /LPF   


 


POC Urine HCG, Qualitative


 Hcg negative


(Negative) 





 


White Blood Count


 


 7.7 x10^3/uL


(4.0-11.0)


 


Red Blood Count


 


 4.36 x10^6/uL


(3.50-5.40)


 


Hemoglobin


 


 10.6 g/dL


(12.0-15.5)  L


 


Hematocrit


 


 33.3 %


(36.0-47.0)  L


 


Mean Corpuscular Volume


 


 76 fL ()


L


 


Mean Corpuscular Hemoglobin


 


 24 pg (25-35)


L


 


Mean Corpuscular Hemoglobin


Concent 


 32 g/dL


(31-37)


 


Red Cell Distribution Width


 


 15.7 %


(11.5-14.5)  H


 


Platelet Count


 


 243 x10^3/uL


(140-400)


 


Neutrophils (%) (Auto)  48 % (31-73)  


 


Lymphocytes (%) (Auto)  35 % (24-48)  


 


Monocytes (%) (Auto)  16 % (0-9)  H


 


Eosinophils (%) (Auto)  1 % (0-3)  


 


Basophils (%) (Auto)  1 % (0-3)  


 


Neutrophils # (Auto)


 


 3.7 x10^3uL


(1.8-7.7)


 


Lymphocytes # (Auto)


 


 2.6 x10^3/uL


(1.0-4.8)


 


Monocytes # (Auto)


 


 1.2 x10^3/uL


(0.0-1.1)  H


 


Eosinophils # (Auto)


 


 0.1 x10^3/uL


(0.0-0.7)


 


Basophils # (Auto)


 


 0.1 x10^3/uL


(0.0-0.2)


 


Sodium Level


 


 134 mmol/L


(136-145)  L


 


Potassium Level


 


 4.3 mmol/L


(3.5-5.1)


 


Chloride Level


 


 97 mmol/L


()  L


 


Carbon Dioxide Level


 


 29 mmol/L


(21-32)


 


Anion Gap  8 (6-14)  


 


Blood Urea Nitrogen


 


 8 mg/dL (7-20)





 


Creatinine


 


 0.7 mg/dL


(0.6-1.0)


 


Estimated GFR


(Cockcroft-Gault) 


 112.1  





 


BUN/Creatinine Ratio  11 (6-20)  


 


Glucose Level


 


 361 mg/dL


(70-99)  H


 


Calcium Level


 


 8.9 mg/dL


(8.5-10.1)


 


Magnesium Level


 


 1.9 mg/dL


(1.8-2.4)


 


Total Bilirubin


 


 0.2 mg/dL


(0.2-1.0)


 


Aspartate Amino Transferase


(AST) 


 14 U/L (15-37)


L


 


Alanine Aminotransferase (ALT)


 


 15 U/L (14-59)





 


Alkaline Phosphatase


 


 174 U/L


()  H


 


Troponin I Quantitative


 


 < 0.017 ng/mL


(0.000-0.055)


 


Total Protein


 


 7.9 g/dL


(6.4-8.2)


 


Albumin


 


 3.5 g/dL


(3.4-5.0)


 


Albumin/Globulin Ratio


 


 0.8 (1.0-1.7)


L





                                Laboratory Tests


6/3/19 00:20








                                Laboratory Tests


6/3/19 00:20














EKG


EKG


[]





Radiology/Procedures


Radiology/Procedures


[]


Impressions:


wet read head ct neg acute





Course & Med Decision Making


Course & Med Decision Making


Pertinent Labs and Imaging studies reviewed. (See chart for details)





[]38 yo f diabetic p/w isolated scalp  numnbess. stroke scale 0 ct head neg


pt reassured


abx for possible uti


counseled on good bg control.


no evidnece of cva clinically





Dragon Disclaimer


Dragon Disclaimer


This electronic medical record was generated, in whole or in part, using a voice

 recognition dictation system.





Departure


Departure


Impression:  


   Primary Impression:  


   Urinary tract infection


Disposition:  01 HOME, SELF-CARE


Condition:  STABLE


Patient Instructions:  Urinary Tract Infection


Scripts


Cephalexin (CEPHALEXIN) 500 Mg Capsule


1 CAP PO QID, #40 CAP


   Prov: LYUDMILA WEINER MD         6/3/19











LYUDMILA WEINER MD             Ashvin 3, 2019 03:14

## 2019-06-03 NOTE — EKG
[facility]

              [address] [place] [address]

Test Date:    2019               Test Time:    00:35:47

Pat Name:     ALEK MONTESINOS         Department:   

Patient ID:   [id number]           Room:          

Gender:       F                        Technician:   

:          [id number]               Requested By: LYUDMILA WEINER

Order Number: [id number]           Reading MD:     

                                 Measurements

Intervals                              Axis          

Rate:         76                       P:            28

VA:           180                      QRS:          9

QRSD:         86                       T:            26

QT:           374                                    

QTc:          425                                    

                           Interpretive Statements

SINUS RHYTHM

NORMAL ECG

No previous ECG available for comparison

## 2019-08-05 ENCOUNTER — HOSPITAL ENCOUNTER (EMERGENCY)
Dept: HOSPITAL 61 - ER | Age: 39
Discharge: HOME | End: 2019-08-05
Payer: COMMERCIAL

## 2019-08-05 VITALS — WEIGHT: 163 LBS | HEIGHT: 62 IN | BODY MASS INDEX: 30 KG/M2

## 2019-08-05 VITALS — DIASTOLIC BLOOD PRESSURE: 52 MMHG | SYSTOLIC BLOOD PRESSURE: 106 MMHG

## 2019-08-05 DIAGNOSIS — E11.9: ICD-10-CM

## 2019-08-05 DIAGNOSIS — Z88.1: ICD-10-CM

## 2019-08-05 DIAGNOSIS — R07.81: ICD-10-CM

## 2019-08-05 DIAGNOSIS — R11.2: ICD-10-CM

## 2019-08-05 DIAGNOSIS — N39.0: Primary | ICD-10-CM

## 2019-08-05 DIAGNOSIS — Z88.2: ICD-10-CM

## 2019-08-05 LAB
ALBUMIN SERPL-MCNC: 3.3 G/DL (ref 3.4–5)
ALBUMIN/GLOB SERPL: 0.7 {RATIO} (ref 1–1.7)
ALP SERPL-CCNC: 145 U/L (ref 46–116)
ALT SERPL-CCNC: 18 U/L (ref 14–59)
AMPHETAMINE/METHAMPHETAMINE: (no result)
ANION GAP SERPL CALC-SCNC: 14 MMOL/L (ref 6–14)
APTT PPP: YELLOW S
AST SERPL-CCNC: 16 U/L (ref 15–37)
BACTERIA #/AREA URNS HPF: (no result) /HPF
BARBITURATES UR-MCNC: (no result) UG/ML
BASOPHILS # BLD AUTO: 0 X10^3/UL (ref 0–0.2)
BASOPHILS NFR BLD: 1 % (ref 0–3)
BENZODIAZ UR-MCNC: (no result) UG/L
BILIRUB SERPL-MCNC: 0.3 MG/DL (ref 0.2–1)
BILIRUB UR QL STRIP: NEGATIVE
BUN SERPL-MCNC: 9 MG/DL (ref 7–20)
BUN/CREAT SERPL: 10 (ref 6–20)
CALCIUM SERPL-MCNC: 8.6 MG/DL (ref 8.5–10.1)
CANNABINOIDS UR-MCNC: (no result) UG/L
CHLORIDE SERPL-SCNC: 96 MMOL/L (ref 98–107)
CO2 SERPL-SCNC: 23 MMOL/L (ref 21–32)
COCAINE UR-MCNC: (no result) NG/ML
CREAT SERPL-MCNC: 0.9 MG/DL (ref 0.6–1)
EOSINOPHIL NFR BLD: 0 % (ref 0–3)
EOSINOPHIL NFR BLD: 0 X10^3/UL (ref 0–0.7)
ERYTHROCYTE [DISTWIDTH] IN BLOOD BY AUTOMATED COUNT: 17.6 % (ref 11.5–14.5)
FIBRINOGEN PPP-MCNC: (no result) MG/DL
GFR SERPLBLD BASED ON 1.73 SQ M-ARVRAT: 83.9 ML/MIN
GLOBULIN SER-MCNC: 5 G/DL (ref 2.2–3.8)
GLUCOSE SERPL-MCNC: 289 MG/DL (ref 70–99)
HCT VFR BLD CALC: 31.9 % (ref 36–47)
HGB BLD-MCNC: 10.2 G/DL (ref 12–15.5)
LYMPHOCYTES # BLD: 0.8 X10^3/UL (ref 1–4.8)
LYMPHOCYTES NFR BLD AUTO: 12 % (ref 24–48)
MCH RBC QN AUTO: 24 PG (ref 25–35)
MCHC RBC AUTO-ENTMCNC: 32 G/DL (ref 31–37)
MCV RBC AUTO: 75 FL (ref 79–100)
METHADONE SERPL-MCNC: (no result) NG/ML
MONO #: 0.3 X10^3/UL (ref 0–1.1)
MONOCYTES NFR BLD: 5 % (ref 0–9)
NEUT #: 5.2 X10^3/UL (ref 1.8–7.7)
NEUTROPHILS NFR BLD AUTO: 82 % (ref 31–73)
NITRITE UR QL STRIP: POSITIVE
OPIATES UR-MCNC: (no result) NG/ML
PCP SERPL-MCNC: (no result) MG/DL
PH UR STRIP: 6 [PH]
PLATELET # BLD AUTO: 254 X10^3/UL (ref 140–400)
POTASSIUM SERPL-SCNC: 3.7 MMOL/L (ref 3.5–5.1)
PROT SERPL-MCNC: 8.3 G/DL (ref 6.4–8.2)
PROT UR STRIP-MCNC: 100 MG/DL
RBC # BLD AUTO: 4.26 X10^6/UL (ref 3.5–5.4)
SODIUM SERPL-SCNC: 133 MMOL/L (ref 136–145)
SQUAMOUS #/AREA URNS LPF: (no result) /LPF
UROBILINOGEN UR-MCNC: 1 MG/DL
WBC # BLD AUTO: 6.3 X10^3/UL (ref 4–11)
WBC #/AREA URNS HPF: (no result) /HPF (ref 0–4)

## 2019-08-05 PROCEDURE — 74177 CT ABD & PELVIS W/CONTRAST: CPT

## 2019-08-05 PROCEDURE — 71100 X-RAY EXAM RIBS UNI 2 VIEWS: CPT

## 2019-08-05 PROCEDURE — 36415 COLL VENOUS BLD VENIPUNCTURE: CPT

## 2019-08-05 PROCEDURE — 96374 THER/PROPH/DIAG INJ IV PUSH: CPT

## 2019-08-05 PROCEDURE — 81001 URINALYSIS AUTO W/SCOPE: CPT

## 2019-08-05 PROCEDURE — 71275 CT ANGIOGRAPHY CHEST: CPT

## 2019-08-05 PROCEDURE — 93005 ELECTROCARDIOGRAM TRACING: CPT

## 2019-08-05 PROCEDURE — 71046 X-RAY EXAM CHEST 2 VIEWS: CPT

## 2019-08-05 PROCEDURE — 85379 FIBRIN DEGRADATION QUANT: CPT

## 2019-08-05 PROCEDURE — 96375 TX/PRO/DX INJ NEW DRUG ADDON: CPT

## 2019-08-05 PROCEDURE — 87040 BLOOD CULTURE FOR BACTERIA: CPT

## 2019-08-05 PROCEDURE — 80307 DRUG TEST PRSMV CHEM ANLYZR: CPT

## 2019-08-05 PROCEDURE — 80053 COMPREHEN METABOLIC PANEL: CPT

## 2019-08-05 PROCEDURE — 99285 EMERGENCY DEPT VISIT HI MDM: CPT

## 2019-08-05 PROCEDURE — 96361 HYDRATE IV INFUSION ADD-ON: CPT

## 2019-08-05 PROCEDURE — 85025 COMPLETE CBC W/AUTO DIFF WBC: CPT

## 2019-08-05 PROCEDURE — 81025 URINE PREGNANCY TEST: CPT

## 2019-08-05 PROCEDURE — 87086 URINE CULTURE/COLONY COUNT: CPT

## 2019-08-05 PROCEDURE — 83605 ASSAY OF LACTIC ACID: CPT

## 2019-08-05 NOTE — RAD
PQRS Compliance Statement:

 

One or more of the following individualized dose reduction techniques were

utilized for this examination:  

1. Automated exposure control  

2. Adjustment of the mA and/or kV according to patient size  

3. Use of iterative reconstruction technique

 

CT angiogram chest, abdomen and pelvis with contrast 8/5/2019 9:50 PM

 

INDICATION: Elevated d-dimer

 

COMPARISON: None available

 

TECHNIQUE: Multiple axial CT images of the chest, abdomen and pelvis were 

obtained after the intravenous administration of nonionic contrast. 

Coronal and sagittal reformats are provided. Maximum intensity projection 

images are provided.

 

FINDINGS: 

 

No pathologically enlarged thoracic lymph nodes are identified. Thyroid 

gland is normal in appearance. Heart size within normal limits. Thoracic 

aorta is normal in course and caliber. No filling defects are identified 

within the opacified portions of the pulmonary arteries to suggest acute 

or chronic pulmonary emboli. No suspicious solid noncalcified pulmonary 

nodules. No pleural effusions, pulmonary vascular congestion or 

pneumothorax. Lungs are clear.

 

Liver, spleen, bilateral adrenal glands, pancreas and gallbladder are 

normal in appearance. The abdominal aorta is normal in course and caliber.

There are no pathologically enlarged lymph nodes in the abdomen and 

pelvis. There is no abdominal free fluid. There is no free intraperitoneal

air. Small and large bowel are normal in caliber. There is no evidence for

bowel obstruction. There are no pericolonic inflammatory changes. A 

normal, nondilated appendix is visualized without adjacent inflammatory 

changes.

 

The kidneys enhance symmetrically. Ill-defined hypoattenuation is 

identified in the inferior pole the right kidney (series 5, image 34) 

measuring 1.7 x 1.3 cm. Correlate with any signs and symptoms of 

pyelonephritis. There is no hydronephrosis. There are no suspected calculi

within the kidneys, ureters or urinary bladder. 5 mm simple cyst in the 

inferior pole the left kidney.

 

Right adnexal cyst measures 3.1 x 3.0 cm. Uterus and left adnexa are 

normal by CT. No suspicious osseous abnormality is identified.

 

IMPRESSION:

1. No evidence for aortic dissection or pulmonary embolism.

2. Focal ill-defined hypoattenuating lesion in the inferior pole the right

kidney measures approximately 1.7 x 1.3 cm. Consideration may be given for

pyelonephritis. Complex or complicated cyst may have similar appearance. 

Consider renal ultrasound or short-term follow-up renal mass protocol CT 

for further evaluation.

 

Electronically signed by: Shalini Gifford MD (8/5/2019 11:01 PM) 

[facility]

## 2019-08-05 NOTE — PHYS DOC
Past Medical History


Past Medical History:  Diabetes-Type II


Additional Past Medical Histor:  vitamin D deficency


 (MARIA FERNANDA FRIEDMAN)


Past Surgical History:  


Additional Past Surgical Histo:  eye surgery (strabismus correction)


 (MARIA FERNANDA FRIEDMAN)


Alcohol Use:  None


Drug Use:  None


 (MARIA FERNANDA FRIEDMAN)





Adult General


Chief Complaint


Chief Complaint:  FLANK PAIN





HPI


HPI





Patient is a 39  year old Female who presents with 1 week of right rib pain with

occasional pain with taking a deep breath. Patient states she is also having 

nausea and vomiting. Patient is rating her pain a 8 out of 10 and states it is 

aching and sharp.


 (MARIA FERNANDA FRIEDMAN)





Review of Systems


Review of Systems





Constitutional: Denies fever or chills []


Eyes: Denies change in visual acuity, redness, or eye pain []


HENT: Denies nasal congestion or sore throat []


Respiratory: Denies cough or shortness of breath []


Cardiovascular: No additional information not addressed in HPI []


GI: Denies abdominal pain, +nausea, +vomiting, denies bloody stools or diarrhea 

[]


: Denies dysuria or hematuria []


Musculoskeletal: Right rib pain. Denies back pain or joint pain []


Integument: Denies rash or skin lesions []


Neurologic: Denies headache, focal weakness or sensory changes []


Endocrine: Denies polyuria or polydipsia []





All other systems were reviewed and found to be within normal limits, except as 

documented in this note.


 (MARIA FERNANDA FRIEDMAN)





Current Medications


Current Medications





Current Medications








 Medications


  (Trade)  Dose


 Ordered  Sig/Robb  Start Time


 Stop Time Status Last Admin


Dose Admin


 


 Acetaminophen


  (Tylenol)  1,000 mg  1X  ONCE  19 20:15


 19 20:22 DC 19 20:31


1,000 MG


 


 Ceftriaxone Sodium


  (Rocephin)  1 gm  1X  ONCE  19 23:00


 19 23:01 DC 19 22:52


1 GM


 


 Fentanyl Citrate


  (Fentanyl 2ml


 Vial)  50 mcg  1X  ONCE  19 20:15


 19 20:22 DC 19 20:38


50 MCG


 


 Info


  (CONTRAST GIVEN


 -- Rx MONITORING)  1 each  PRN DAILY  PRN  19 22:30


 19 23:39 DC  





 


 Iohexol


  (Omnipaque 350


 Mg/ml)  90 ml  1X  ONCE  19 22:30


 19 22:31 DC 19 22:35


90 ML


 


 Ondansetron HCl


  (Zofran)  4 mg  1X  ONCE  19 20:15


 19 20:22 DC 19 20:33


4 MG


 


 Sodium Chloride  1,000 ml @ 


 1,000 mls/hr  1X  ONCE  19 20:30


 19 21:29 DC 19 20:30


1,000 MLS/HR





 (LYUDMILA ASHRAF MD)





Allergies


Allergies





Allergies








Coded Allergies Type Severity Reaction Last Updated Verified


 


  sulfamethoxazole Adverse Reaction Intermediate nausea and vomiting 18 Yes


 


  trimethoprim Adverse Reaction Intermediate nausea and vomiting 18 Yes





 (LYUDMILA ASHRAF MD)





Physical Exam


Physical Exam





Constitutional: Well developed, well nourished, no acute distress, non-toxic 

appearance. []


HENT: Normocephalic, atraumatic, bilateral external ears normal, oropharynx 

moist, no oral exudates, nose normal. []


Eyes: PERRLA, EOMI, conjunctiva normal, no discharge. [] 


Neck: Normal range of motion, no tenderness, supple, no stridor. [] 


Cardiovascular:Heart rate regular tachy rhythm, no murmur []


Lungs & Thorax:  Bilateral breath sounds clear to auscultation []


Abdomen: Bowel sounds normal, soft, no tenderness, no masses, no pulsatile 

masses. [] 


Skin: Warm, moist, no erythema, no rash. [] 


Back: Right rib tenderness with palpation. No tenderness, no CVA tenderness. [] 


Extremities: No tenderness, no cyanosis, no clubbing, ROM intact, no edema. [] 


Neurologic: Alert and oriented X 3, normal motor function, normal sensory 

function, no focal deficits noted. []


Psychologic: Affect normal, judgement normal, mood normal. []


 (MARIA FERNANDA FRIEDMAN)





Current Patient Data


Vital Signs





                                   Vital Signs








  Date Time  Temp Pulse Resp B/P (MAP) Pulse Ox O2 Delivery O2 Flow Rate FiO2


 


19 23:07  72 14 106/52 (70) 98 Room Air  


 


19 19:41 99.9       





 99.9       





 (LYUDMILA ASHRAF MD)


Lab Values





                                Laboratory Tests








Test


 19


19:30 19


20:10 19


20:30 19


22:15


 


Urine Opiates Screen Neg (NEG)     


 


Urine Methadone Screen Neg (NEG)     


 


Urine Barbiturates Neg (NEG)     


 


Urine Phencyclidine Screen Neg (NEG)     


 


Urine


Amphetamine/Methamphetamine Neg (NEG)  


 


 


 





 


Urine Benzodiazepines Screen Neg (NEG)     


 


Urine Cocaine Screen Neg (NEG)     


 


Urine Cannabinoids Screen Neg (NEG)     


 


Urine Ethyl Alcohol Neg (NEG)     


 


POC Urine HCG, Qualitative


 


 Hcg negative


(Negative) 


 





 


White Blood Count


 


 


 6.3 x10^3/uL


(4.0-11.0) 





 


Red Blood Count


 


 


 4.26 x10^6/uL


(3.50-5.40) 





 


Hemoglobin


 


 


 10.2 g/dL


(12.0-15.5)  L 





 


Hematocrit


 


 


 31.9 %


(36.0-47.0)  L 





 


Mean Corpuscular Volume


 


 


 75 fL ()


L 





 


Mean Corpuscular Hemoglobin


 


 


 24 pg (25-35)


L 





 


Mean Corpuscular Hemoglobin


Concent 


 


 32 g/dL


(31-37) 





 


Red Cell Distribution Width


 


 


 17.6 %


(11.5-14.5)  H 





 


Platelet Count


 


 


 254 x10^3/uL


(140-400) 





 


Neutrophils (%) (Auto)   82 % (31-73)  H 


 


Lymphocytes (%) (Auto)   12 % (24-48)  L 


 


Monocytes (%) (Auto)   5 % (0-9)   


 


Eosinophils (%) (Auto)   0 % (0-3)   


 


Basophils (%) (Auto)   1 % (0-3)   


 


Neutrophils # (Auto)


 


 


 5.2 x10^3/uL


(1.8-7.7) 





 


Lymphocytes # (Auto)


 


 


 0.8 x10^3/uL


(1.0-4.8)  L 





 


Monocytes # (Auto)


 


 


 0.3 x10^3/uL


(0.0-1.1) 





 


Eosinophils # (Auto)


 


 


 0.0 x10^3/uL


(0.0-0.7) 





 


Basophils # (Auto)


 


 


 0.0 x10^3/uL


(0.0-0.2) 





 


D-Dimer (Iva)


 


 


 0.71 ug/mlFEU


(0.00-0.50)  H 





 


Sodium Level


 


 


 133 mmol/L


(136-145)  L 





 


Potassium Level


 


 


 3.7 mmol/L


(3.5-5.1) 





 


Chloride Level


 


 


 96 mmol/L


()  L 





 


Carbon Dioxide Level


 


 


 23 mmol/L


(21-32) 





 


Anion Gap   14 (6-14)   


 


Blood Urea Nitrogen


 


 


 9 mg/dL (7-20)


 





 


Creatinine


 


 


 0.9 mg/dL


(0.6-1.0) 





 


Estimated GFR


(Cockcroft-Gault) 


 


 83.9  


 





 


BUN/Creatinine Ratio   10 (6-20)   


 


Glucose Level


 


 


 289 mg/dL


(70-99)  H 





 


Lactic Acid Level


 


 


 2.3 mmol/L


(0.4-2.0)  H 





 


Calcium Level


 


 


 8.6 mg/dL


(8.5-10.1) 





 


Total Bilirubin


 


 


 0.3 mg/dL


(0.2-1.0) 





 


Aspartate Amino Transferase


(AST) 


 


 16 U/L (15-37)


 





 


Alanine Aminotransferase (ALT)


 


 


 18 U/L (14-59)


 





 


Alkaline Phosphatase


 


 


 145 U/L


()  H 





 


Total Protein


 


 


 8.3 g/dL


(6.4-8.2)  H 





 


Albumin


 


 


 3.3 g/dL


(3.4-5.0)  L 





 


Albumin/Globulin Ratio


 


 


 0.7 (1.0-1.7)


L 





 


Urine Collection Type    Unknown  


 


Urine Color    Yellow  


 


Urine Clarity    Cloudy  


 


Urine pH    6.0  


 


Urine Specific Gravity    1.020  


 


Urine Protein


 


 


 


 100 mg/dL


(NEG-TRACE)


 


Urine Glucose (UA)


 


 


 


 >=1000 mg/dL


(NEG)


 


Urine Ketones (Stick)


 


 


 


 15 mg/dL (NEG)





 


Urine Blood


 


 


 


 Moderate (NEG)





 


Urine Nitrite


 


 


 


 Positive (NEG)





 


Urine Bilirubin


 


 


 


 Negative (NEG)





 


Urine Urobilinogen Dipstick


 


 


 


 1.0 mg/dL (0.2


mg/dL)


 


Urine Leukocyte Esterase    Large (NEG)  


 


Urine RBC


 


 


 


 11-20 /HPF


(0-2)


 


Urine WBC


 


 


 


 Tntc /HPF


(0-4)


 


Urine Squamous Epithelial


Cells 


 


 


 Few /LPF  





 


Urine Bacteria


 


 


 


 Many /HPF


(0-FEW)


 


Urine Mucus    Mod /LPF  





                                Laboratory Tests


19 20:30








                                Laboratory Tests


19 20:30








 (LYUDMILA ASHRAF MD)


Lab Values





                                Laboratory Tests








Test


 19


19:30 19


20:10 19


20:30 19


22:15


 


Urine Opiates Screen Neg (NEG)     


 


Urine Methadone Screen Neg (NEG)     


 


Urine Barbiturates Neg (NEG)     


 


Urine Phencyclidine Screen Neg (NEG)     


 


Urine


Amphetamine/Methamphetamine Neg (NEG)  


 


 


 





 


Urine Benzodiazepines Screen Neg (NEG)     


 


Urine Cocaine Screen Neg (NEG)     


 


Urine Cannabinoids Screen Neg (NEG)     


 


Urine Ethyl Alcohol Neg (NEG)     


 


POC Urine HCG, Qualitative


 


 Hcg negative


(Negative) 


 





 


White Blood Count


 


 


 6.3 x10^3/uL


(4.0-11.0) 





 


Red Blood Count


 


 


 4.26 x10^6/uL


(3.50-5.40) 





 


Hemoglobin


 


 


 10.2 g/dL


(12.0-15.5)  L 





 


Hematocrit


 


 


 31.9 %


(36.0-47.0)  L 





 


Mean Corpuscular Volume


 


 


 75 fL ()


L 





 


Mean Corpuscular Hemoglobin


 


 


 24 pg (25-35)


L 





 


Mean Corpuscular Hemoglobin


Concent 


 


 32 g/dL


(31-37) 





 


Red Cell Distribution Width


 


 


 17.6 %


(11.5-14.5)  H 





 


Platelet Count


 


 


 254 x10^3/uL


(140-400) 





 


Neutrophils (%) (Auto)   82 % (31-73)  H 


 


Lymphocytes (%) (Auto)   12 % (24-48)  L 


 


Monocytes (%) (Auto)   5 % (0-9)   


 


Eosinophils (%) (Auto)   0 % (0-3)   


 


Basophils (%) (Auto)   1 % (0-3)   


 


Neutrophils # (Auto)


 


 


 5.2 x10^3/uL


(1.8-7.7) 





 


Lymphocytes # (Auto)


 


 


 0.8 x10^3/uL


(1.0-4.8)  L 





 


Monocytes # (Auto)


 


 


 0.3 x10^3/uL


(0.0-1.1) 





 


Eosinophils # (Auto)


 


 


 0.0 x10^3/uL


(0.0-0.7) 





 


Basophils # (Auto)


 


 


 0.0 x10^3/uL


(0.0-0.2) 





 


D-Dimer (Iva)


 


 


 0.71 ug/mlFEU


(0.00-0.50)  H 





 


Sodium Level


 


 


 133 mmol/L


(136-145)  L 





 


Potassium Level


 


 


 3.7 mmol/L


(3.5-5.1) 





 


Chloride Level


 


 


 96 mmol/L


()  L 





 


Carbon Dioxide Level


 


 


 23 mmol/L


(21-32) 





 


Anion Gap   14 (6-14)   


 


Blood Urea Nitrogen


 


 


 9 mg/dL (7-20)


 





 


Creatinine


 


 


 0.9 mg/dL


(0.6-1.0) 





 


Estimated GFR


(Cockcroft-Gault) 


 


 83.9  


 





 


BUN/Creatinine Ratio   10 (6-20)   


 


Glucose Level


 


 


 289 mg/dL


(70-99)  H 





 


Lactic Acid Level


 


 


 2.3 mmol/L


(0.4-2.0)  H 





 


Calcium Level


 


 


 8.6 mg/dL


(8.5-10.1) 





 


Total Bilirubin


 


 


 0.3 mg/dL


(0.2-1.0) 





 


Aspartate Amino Transferase


(AST) 


 


 16 U/L (15-37)


 





 


Alanine Aminotransferase (ALT)


 


 


 18 U/L (14-59)


 





 


Alkaline Phosphatase


 


 


 145 U/L


()  H 





 


Total Protein


 


 


 8.3 g/dL


(6.4-8.2)  H 





 


Albumin


 


 


 3.3 g/dL


(3.4-5.0)  L 





 


Albumin/Globulin Ratio


 


 


 0.7 (1.0-1.7)


L 





 


Urine Collection Type    Unknown  


 


Urine Color    Yellow  


 


Urine Clarity    Cloudy  


 


Urine pH    6.0  


 


Urine Specific Gravity    1.020  


 


Urine Protein


 


 


 


 100 mg/dL


(NEG-TRACE)


 


Urine Glucose (UA)


 


 


 


 >=1000 mg/dL


(NEG)


 


Urine Ketones (Stick)


 


 


 


 15 mg/dL (NEG)





 


Urine Blood


 


 


 


 Moderate (NEG)





 


Urine Nitrite


 


 


 


 Positive (NEG)





 


Urine Bilirubin


 


 


 


 Negative (NEG)





 


Urine Urobilinogen Dipstick


 


 


 


 1.0 mg/dL (0.2


mg/dL)


 


Urine Leukocyte Esterase    Large (NEG)  


 


Urine RBC


 


 


 


 11-20 /HPF


(0-2)


 


Urine WBC


 


 


 


 Tntc /HPF


(0-4)


 


Urine Squamous Epithelial


Cells 


 


 


 Few /LPF  





 


Urine Bacteria


 


 


 


 Many /HPF


(0-FEW)


 


Urine Mucus    Mod /LPF  





                                Laboratory Tests


19 20:30








                                Laboratory Tests


19 20:30








 (MARIA FERNANDA FRIEDMAN)





EKG


EKG


Sinus Tach and no STEMI[]


Interpretation Time:


 and read by Dr Ashraf


 (MARIA FERNANDA FRIEDMAN)





Radiology/Procedures


Radiology/Procedures


[]


 (MARIA FERNANDA FRIEDMAN)





Course & Med Decision Making


Course & Med Decision Making


Patient is a 39  year old Female who presents with 1 week of right rib pain with

 occasional pain with taking a deep breath. Patient states she is also having 

nausea and vomiting. Patient is rating her pain a 8 out of 10 and states it is 

aching and sharp. Patient denies abdominal pain, headache, dizziness, palp

itations, chest pain, shortness of air, numbness or tingling, weakness. Upon 

walking the room patient is lying in bed on her right side with the blankets 

over her head and when I introduced myself she does not take the blankets off of

 her self. I take the blankets off for the patient and asked her again. Patient 

didn't response but does not roll over on the bed and she is asked several times

 to do so. Patient does comply. Patient does not have any pain with moving in 

the bed. Lungs are clear to auscultation in all lobes. Abdomen is soft and 

nontender. Patient has right rib tenderness with palpation. No CVA tenderness. 

Patient states she has been vomiting. Patient is diabetic. Patient heart rate is

 in the 130s. Patient denies any dysuria symptoms. Alert and oriented. PERRLA. 

No extremity swelling. Patient's skin is pink warm and moist. Patient states she

 is very cold. Patient's temp is 99.9. Speaks in full clear sentences. Denies 

any visual changes. Ambulatory with a steady gait.








Chest x-ray read by Dr. Ashraf and shows no acute findings. D-dimer is 

elevated and have ordered a CT angiogram chest. EKG shows sinus tachycardia no 

STEMI. Patient's lactic acid is elevated and she have received 2 L of normal 

saline. Patient urinalysis shows infection. I have started Rocephin. 





2247: Patient is reported off to Dr Ashraf.





 (MARIA FERNANDA FRIEDMAN)


Course & Med Decision Making


IMPRESSION:


1. No evidence for aortic dissection or pulmonary embolism.


2. Focal ill-defined hypoattenuating lesion in the inferior pole the right


kidney measures approximately 1.7 x 1.3 cm. Consideration may be given for


pyelonephritis. Complex or complicated cyst may have similar appearance. 


Consider renal ultrasound or short-term follow-up renal mass protocol CT 


for further evaluation.


 


Electronically signed by: Shalini Gifford MD (2019 11:01 PM) 


[facility]








i saw pt


she feels better


eager to go home


rx keflex


hr no longer tachycardic


return prec discussed





 (LYUDMILA ASHRAF MD)


Dragon Disclaimer


Dragon Disclaimer


This electronic medical record was generated, in whole or in part, using a voice

 recognition dictation system.


 (MARIA FERNANDA FRIEDMAN)





Departure


Departure


Impression:  


   Primary Impression:  


   Urinary tract infection


Disposition:   HOME, SELF-CARE


Condition:  STABLE


Referrals:  


CODIE STANFORD (PCP)


Scripts


Cephalexin (CEPHALEXIN) 500 Mg Tablet


1 TAB PO QID, #40 TAB


   Prov: LYUDMILA ASHRAF MD         19











MARIA FERNANDA FRIEDMAN             Aug 5, 2019 20:26


LYUDMILA ASHRAF MD             Aug 6, 2019 01:01

## 2019-08-06 NOTE — RAD
CHEST PA   LATERAL, RIBS RIGHT

 

History: Right-sided flank pain, nausea and vomiting 

 

Comparison: 3/20/2018 chest AP view

 

Findings: 

Frontal and lateral views of chest were obtained. Frontal view of the 

right ribs and oblique view of the right ribs were provided. The 

cardiomediastinal silhouette is normal. Pulmonary vasculature is normal. 

The lungs are clear. No pleural effusion or pneumothorax is seen. There is

no acute bone abnormality. No right rib fracture.

 

IMPRESSION: 

No acute cardiopulmonary process. 

 

 

Electronically signed by: Saman Morales MD (8/6/2019 8:19 AM) [facility]

## 2019-08-06 NOTE — EKG
[facility]

              [address] [place] [address]

Test Date:    2019               Test Time:    20:23:11

Pat Name:     ALEK MONTESINOS         Department:   

Patient ID:   [id number]           Room:          

Gender:       F                        Technician:   

:          1980               Requested By: MARIA FERNANDA FRIEDMAN

Order Number: [id number]           Reading MD:     

                                 Measurements

Intervals                              Axis          

Rate:         119                      P:            41

NM:           146                      QRS:          34

QRSD:         80                       T:            34

QT:           304                                    

QTc:          428                                    

                           Interpretive Statements

SINUS TACHYCARDIA

LEFT ATRIAL ABNORMALITY

ABNORMAL ECG

RI6.01          Unconfirmed report

No previous ECG available for comparison

## 2019-08-06 NOTE — RAD
CHEST PA   LATERAL, RIBS RIGHT

 

History: Right-sided flank pain, nausea and vomiting 

 

Comparison: 3/20/2018 chest AP view

 

Findings: 

Frontal and lateral views of chest were obtained. Frontal view of the 

right ribs and oblique view of the right ribs were provided. The 

cardiomediastinal silhouette is normal. Pulmonary vasculature is normal. 

The lungs are clear. No pleural effusion or pneumothorax is seen. There is

no acute bone abnormality. No right rib fracture.

 

IMPRESSION: 

No acute cardiopulmonary process. 

 

 

Electronically signed by: Saman Morlaes MD (8/6/2019 8:19 AM) [facility]

## 2020-02-21 ENCOUNTER — HOSPITAL ENCOUNTER (EMERGENCY)
Dept: HOSPITAL 61 - ER | Age: 40
LOS: 1 days | Discharge: HOME | End: 2020-02-22
Payer: COMMERCIAL

## 2020-02-21 VITALS — BODY MASS INDEX: 32.09 KG/M2 | HEIGHT: 62 IN | WEIGHT: 174.39 LBS

## 2020-02-21 DIAGNOSIS — Z88.2: ICD-10-CM

## 2020-02-21 DIAGNOSIS — Z88.1: ICD-10-CM

## 2020-02-21 DIAGNOSIS — E11.9: ICD-10-CM

## 2020-02-21 DIAGNOSIS — N39.0: Primary | ICD-10-CM

## 2020-02-21 DIAGNOSIS — J45.909: ICD-10-CM

## 2020-02-21 DIAGNOSIS — F17.200: ICD-10-CM

## 2020-02-21 PROCEDURE — 87086 URINE CULTURE/COLONY COUNT: CPT

## 2020-02-21 PROCEDURE — 99283 EMERGENCY DEPT VISIT LOW MDM: CPT

## 2020-02-21 PROCEDURE — 81001 URINALYSIS AUTO W/SCOPE: CPT

## 2020-02-21 PROCEDURE — 81025 URINE PREGNANCY TEST: CPT

## 2020-02-22 VITALS — SYSTOLIC BLOOD PRESSURE: 123 MMHG | DIASTOLIC BLOOD PRESSURE: 67 MMHG

## 2020-02-22 LAB
APTT PPP: YELLOW S
BACTERIA #/AREA URNS HPF: (no result) /HPF
BILIRUB UR QL STRIP: NEGATIVE
FIBRINOGEN PPP-MCNC: CLEAR MG/DL
NITRITE UR QL STRIP: NEGATIVE
PH UR STRIP: 6 [PH]
PROT UR STRIP-MCNC: NEGATIVE MG/DL
RBC #/AREA URNS HPF: (no result) /HPF (ref 0–2)
SQUAMOUS #/AREA URNS LPF: (no result) /LPF
UROBILINOGEN UR-MCNC: 0.2 MG/DL
WBC #/AREA URNS HPF: (no result) /HPF (ref 0–4)

## 2020-02-22 NOTE — PHYS DOC
Past Medical History


Past Medical History:  Asthma, Bronchitis, Diabetes-Type II


Additional Past Medical Histor:  vitamin D deficency


Past Surgical History:  No Surgical History


Additional Past Surgical Histo:  eye surgery (strabismus correction)


Smoking Status:  Current Every Day Smoker


Alcohol Use:  None


Drug Use:  None





Adult General


Chief Complaint


Chief Complaint:  LOWER EXT PAIN





HPI


HPI





Patient is a 39  year old AA female who presents to the ER with complaints of 

lower back pain and suprapubic pain for the last 2 days. She reports that she 

feels like she has a UTI. Pt reports increased urinary frequency, foul smelling 

urine, and dysuria. She denies any hematuria, fever, nausea, vomiting, or 

diarrhea. Pt states that her back pain does not radiate into her lower 

extremities and that her discomfort is not worse with movement. She denies any 

numbness, tingling, or weakness of her lower extremities. She currently rates 

her pain a 10/10 on the pain scale she denies any alleviating factors.





Review of Systems


Review of Systems


Complete ROS is negative unless otherwise noted in HPI.





Allergies


Allergies





Allergies








Coded Allergies Type Severity Reaction Last Updated Verified


 


  sulfamethoxazole Adverse Reaction Intermediate nausea and vomiting 2/12/18 Yes


 


  trimethoprim Adverse Reaction Intermediate nausea and vomiting 2/12/18 Yes











Physical Exam


Physical Exam


See Above


Constitutional: Well developed, well nourished, no acute distress, non-toxic 

appearance. []


HENT: Normocephalic, atraumatic, bilateral external ears normal, nose normal. []


Eyes: PERRLA, EOMI, conjunctiva normal, no discharge. [] 


Neck: Normal range of motion, no stridor. [] 


Cardiovascular:Heart rate regular rhythm


Lungs & Thorax:  Respirations even and unlabored, no retractions, no respiratory

 distress


Skin: Warm, dry, no erythema, no rash. [] 


Back: No bony tenderness, no CVA tenderness. [] 


Extremities: No cyanosis, ROM intact, no edema. [] 


Neurologic: Alert and oriented X 3, no focal deficits noted. []


Psychologic: Affect normal, judgement normal, mood normal. []





Current Patient Data


Vital Signs





                                   Vital Signs








  Date Time  Temp Pulse Resp B/P (MAP) Pulse Ox O2 Delivery O2 Flow Rate FiO2


 


2/22/20 00:00 98.7 93 16 123/67 (85) 98 Room Air  





 98.7       








Lab Values





                                Laboratory Tests








Test


 2/22/20


00:02 2/22/20


00:07


 


Urine Collection Type Unknown   


 


Urine Color Yellow   


 


Urine Clarity Clear   


 


Urine pH 6.0   


 


Urine Specific Gravity >=1.030   


 


Urine Protein


 Negative mg/dL


(NEG-TRACE) 





 


Urine Glucose (UA)


 >=1000 mg/dL


(NEG) 





 


Urine Ketones (Stick)


 Negative mg/dL


(NEG) 





 


Urine Blood


 Negative (NEG)


 





 


Urine Nitrite


 Negative (NEG)


 





 


Urine Bilirubin


 Negative (NEG)


 





 


Urine Urobilinogen Dipstick


 0.2 mg/dL (0.2


mg/dL) 





 


Urine Leukocyte Esterase


 Negative (NEG)


 





 


Urine RBC


 1-2 /HPF (0-2)


 





 


Urine WBC


 1-4 /HPF (0-4)


 





 


Urine Squamous Epithelial


Cells Few /LPF  


 





 


Urine Bacteria


 Moderate /HPF


(0-FEW) 





 


POC Urine HCG, Qualitative


 


 Hcg negative


(Negative)











EKG


EKG


[]





Radiology/Procedures


Radiology/Procedures


[]





Course & Med Decision Making


Course & Med Decision Making


Pertinent Labs and Imaging studies reviewed. (See chart for details)





[]





Dragon Disclaimer


Dragon Disclaimer


This electronic medical record was generated, in whole or in part, using a voice

 recognition dictation system.





Departure


Departure


Impression:  


   Primary Impression:  


   Urinary tract infection


Disposition:  01 HOME, SELF-CARE


Condition:  STABLE


Referrals:  


CODIE STANFORD (PCP)


Patient Instructions:  Urinary Tract Infection, Easy-to-Read





Additional Instructions:  


Fill the prescriptions and use as directed. Increase fluids, avoid bladder 

irritants including caffeine, carbonation, and spicy foods. Follow up with your 

primary care doctor next week. Return to the ER if symptoms worsen.


Scripts


Cephalexin (CEPHALEXIN) 500 Mg Capsule


1 CAP PO TID for 7 Days, #21 CAP 0 Refills


   Prov: MADDI JAIN         2/22/20 


Phenazopyridine Hcl (PYRIDIUM) 200 Mg Tablet


1 TAB PO TID for urinary discomfort for 3 Days, #9 TAB 0 Refills


   Prov: MADDI JAIN         2/22/20





Problem Qualifiers








   Primary Impression:  


   Urinary tract infection


   Urinary tract infection type:  site unspecified  Hematuria presence:  without

    hematuria  Qualified Codes:  N39.0 - Urinary tract infection, site not 

   specified








MADDI JAIN       Feb 22, 2020 01:02

## 2021-03-09 ENCOUNTER — HOSPITAL ENCOUNTER (EMERGENCY)
Dept: HOSPITAL 61 - ER | Age: 41
Discharge: HOME | End: 2021-03-09
Payer: COMMERCIAL

## 2021-03-09 VITALS — DIASTOLIC BLOOD PRESSURE: 67 MMHG | SYSTOLIC BLOOD PRESSURE: 125 MMHG

## 2021-03-09 VITALS — WEIGHT: 183.2 LBS | BODY MASS INDEX: 33.71 KG/M2 | HEIGHT: 62 IN

## 2021-03-09 DIAGNOSIS — F17.200: ICD-10-CM

## 2021-03-09 DIAGNOSIS — H00.014: Primary | ICD-10-CM

## 2021-03-09 DIAGNOSIS — H00.011: ICD-10-CM

## 2021-03-09 DIAGNOSIS — Z88.8: ICD-10-CM

## 2021-03-09 DIAGNOSIS — Z98.51: ICD-10-CM

## 2021-03-09 DIAGNOSIS — J45.909: ICD-10-CM

## 2021-03-09 DIAGNOSIS — Z88.2: ICD-10-CM

## 2021-03-09 DIAGNOSIS — E11.9: ICD-10-CM

## 2021-03-09 DIAGNOSIS — Z98.890: ICD-10-CM

## 2021-03-09 PROCEDURE — 99283 EMERGENCY DEPT VISIT LOW MDM: CPT

## 2021-03-09 NOTE — PHYS DOC
Past Medical History


Past Medical History:  Asthma, Bronchitis, Diabetes-Type II


Additional Past Medical Histor:  vitamin D deficency


Past Surgical History:  , Tubal ligation, Other


Additional Past Surgical Histo:  eye surgery (strabismus correction)


Smoking Status:  Current Every Day Smoker


Additional Information:  


 ppd


Alcohol Use:  None


Drug Use:  None





General Adult


EDM:


Chief Complaint:  EYE PROBLEMS





HPI:


HPI:





Patient is a 41  year old female who presented to ER due to 2-day history of s

kin irritation on her upper eyelids bilaterally.  Patient denies any discharge 

or drainage from her eyes, denies any redness, denies any blurry vision.  

Patient denies any fever





Review of Systems:


Review of Systems:


Constitutional:   Denies fever or chills. []


Eyes:   Denies change in visual acuity. []


HENT:   Denies nasal congestion or sore throat. [] 


Respiratory:   Denies cough or shortness of breath. [] 


Cardiovascular:   Denies chest pain or edema. [] 


GI:   Denies abdominal pain, nausea, vomiting, bloody stools or diarrhea. [] 


:  Denies dysuria. [] 


Musculoskeletal:   Denies back pain or joint pain. [] 


Integument:   Denies rash. [] 


Neurologic:   Denies headache, focal weakness or sensory changes. [] 


Endocrine:   Denies polyuria or polydipsia. [] 


Lymphatic:  Denies swollen glands. [] 


Psychiatric:  Denies depression or anxiety. []





Heart Score:


C/O Chest Pain:  N/A


Risk Factors:


Risk Factors:  DM, Current or recent (<one month) smoker, HTN, HLP, family 

history of CAD, obesity.


Risk Scores:


Score 0 - 3:  2.5% MACE over next 6 weeks - Discharge Home


Score 4 - 6:  20.3% MACE over next 6 weeks - Admit for Clinical Observation


Score 7 - 10:  72.7% MACE over next 6 weeks - Early Invasive Strategies





Allergies:


Allergies:





Allergies








Coded Allergies Type Severity Reaction Last Updated Verified


 


  sulfamethoxazole Adverse Reaction Intermediate nausea and vomiting 3/9/21 Yes


 


  trimethoprim Adverse Reaction Intermediate nausea and vomiting 3/9/21 Yes











Physical Exam:


PE:





Constitutional: Well developed, well nourished, no acute distress, non-toxic 

appearance. []


HENT: Normocephalic, atraumatic, bilateral external ears normal, oropharynx m

oist, no oral exudates, nose normal. []


Eyes: PERRLA, EOMI, conjunctiva normal, no discharge. THERE IS A TENDER STY AT 

MIDDLE PART OF UPPER EYELID BILATERALLY. 


] 





Neurologic: Alert and oriented X 3, normal motor function, normal sensory 

function, no focal deficits noted. []


Psychologic: Affect normal, judgement normal, mood normal. []





Current Patient Data:


Vital Signs:





                                   Vital Signs








  Date Time  Temp Pulse Resp B/P (MAP) Pulse Ox O2 Delivery O2 Flow Rate FiO2


 


3/9/21 11:02 97.9 82 16 125/67 (86) 98 Room Air  





 97.9       











EKG:


EKG:


[]





Radiology/Procedures:


Radiology/Procedures:


[]





Course & Med Decision Making:


Course & Med Decision Making


Pertinent Labs and Imaging studies reviewed. (See chart for details)





[]





Dragon Disclaimer:


Dragon Disclaimer:


This electronic medical record was generated, in whole or in part, using a voice

 recognition dictation system.





Departure


Departure


Impression:  


   Primary Impression:  


   External hordeolum


Disposition:  01 DC HOME SELF CARE/HOMELESS


Condition:  STABLE


Referrals:  


CODIE STANFORD (PCP)


follow up with your doctor on Monday


Patient Instructions:  Sty


Scripts


Gentamicin Sulfate (GENTAMICIN SULFATE 0.3% OPHTH OINT) 3.5 Gm Oint...g.


1 ANDREA EACHEYE TID for 7 Days, #2 EACH


   Prov: MARTÍN RAO DO         3/9/21











MARTÍN RAO DO                 Mar 9, 2021 11:23

## 2021-07-15 ENCOUNTER — HOSPITAL ENCOUNTER (EMERGENCY)
Dept: HOSPITAL 61 - ER | Age: 41
Discharge: HOME | End: 2021-07-15
Payer: COMMERCIAL

## 2021-07-15 VITALS — DIASTOLIC BLOOD PRESSURE: 75 MMHG | SYSTOLIC BLOOD PRESSURE: 129 MMHG

## 2021-07-15 VITALS — BODY MASS INDEX: 34.08 KG/M2 | WEIGHT: 185.19 LBS | HEIGHT: 62 IN

## 2021-07-15 DIAGNOSIS — J45.909: ICD-10-CM

## 2021-07-15 DIAGNOSIS — E11.9: ICD-10-CM

## 2021-07-15 DIAGNOSIS — Z98.51: ICD-10-CM

## 2021-07-15 DIAGNOSIS — S80.862A: Primary | ICD-10-CM

## 2021-07-15 DIAGNOSIS — Z88.1: ICD-10-CM

## 2021-07-15 DIAGNOSIS — Z88.2: ICD-10-CM

## 2021-07-15 DIAGNOSIS — L03.116: ICD-10-CM

## 2021-07-15 DIAGNOSIS — F17.200: ICD-10-CM
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
## 2021-07-15 DIAGNOSIS — Z98.890: ICD-10-CM
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
## 2021-07-15 PROCEDURE — 90715 TDAP VACCINE 7 YRS/> IM: CPT

## 2021-07-15 PROCEDURE — 99283 EMERGENCY DEPT VISIT LOW MDM: CPT

## 2021-07-15 PROCEDURE — 90471 IMMUNIZATION ADMIN: CPT

## 2021-07-15 NOTE — PHYS DOC
Past Medical History


Past Medical History:  Asthma, Bronchitis, Diabetes-Type II


Additional Past Medical Histor:  vitamin D deficency,DM


Past Surgical History:  , Tubal ligation, Other


Additional Past Surgical Histo:  eye surgery (strabismus correction)


Smoking Status:  Current Every Day Smoker


Alcohol Use:  None


Drug Use:  None





General Adult


EDM:


Chief Complaint:  INSECT BITE





HPI:


HPI:





Patient is a 41  year old female with a history of diabetes type 2, asthma, 

bronchitis, who presents to the ED today complaining of insect bite to the left 

shin that she noted 2 days ago.  Patient states the area is red and warm.  

Denies any drainage.  Denies any anaphylactic reaction symptoms.





Review of Systems:


Review of Systems:


Constitutional:   Denies fever or chills. []


Musculoskeletal:   Denies back pain or joint pain. [] 


Integument: Reports insect bite to the left shin


Neurologic:   Denies headache, focal weakness or sensory changes. [] 


Psychiatric:  Denies depression or anxiety. []





Heart Score:


C/O Chest Pain:  N/A


Risk Factors:


Risk Factors:  DM, Current or recent (<one month) smoker, HTN, HLP, family 

history of CAD, obesity.


Risk Scores:


Score 0 - 3:  2.5% MACE over next 6 weeks - Discharge Home


Score 4 - 6:  20.3% MACE over next 6 weeks - Admit for Clinical Observation


Score 7 - 10:  72.7% MACE over next 6 weeks - Early Invasive Strategies





Current Medications:





Current Medications








 Medications


  (Trade)  Dose


 Ordered  Sig/Robb  Start Time


 Stop Time Status Last Admin


Dose Admin


 


 Diphtheria/


 Tetanus/Acell


 Pertussis


  (ADACEL TDap


 SYRINGE)  0.5 ml  ONCE ONCE  7/15/21 12:15


 7/15/21 12:16 UNV  














Allergies:


Allergies:





Allergies








Coded Allergies Type Severity Reaction Last Updated Verified


 


  sulfamethoxazole Adverse Reaction Intermediate nausea and vomiting 3/9/21 Yes


 


  trimethoprim Adverse Reaction Intermediate nausea and vomiting 3/9/21 Yes











Physical Exam:


PE:





Constitutional: Well developed, well nourished, no acute distress, non-toxic 

appearance. [] 


Skin: Left shin with an area of cellulitis approximately 2 x 2 cm, the area is 

warm tender to touch, no fluctuance


Back: No tenderness, no CVA tenderness. [] 


Extremities: No tenderness, no cyanosis, no clubbing, ROM intact, no edema. [] 


Neurologic: Alert and oriented X 3, normal motor function, normal sensory 

function, no focal deficits noted. []


Psychologic: Affect normal, judgement normal, mood normal. []





Current Patient Data:


Vital Signs:





                                   Vital Signs








  Date Time  Temp Pulse Resp B/P (MAP) Pulse Ox O2 Delivery O2 Flow Rate FiO2


 


7/15/21 11:57 98.4 67 16 118/63 (86) 100 Room Air  





 98.4       











EKG:


EKG:


[]





Radiology/Procedures:


Radiology/Procedures:


[]





Course & Med Decision Making:


Course & Med Decision Making


Pertinent Labs and Imaging studies reviewed. (See chart for details)





This is a 41-year-old female patient presented to the ED today with cellulitis 

of the left shin after being bit by an insect.  Tetanus was updated.  Discharged

 on mupirocin and cephalexin.  Wound care instructions and return precautions 

provided





Dragon Disclaimer:


Dragon Disclaimer:


This electronic medical record was generated, in whole or in part, using a voice

 recognition dictation system.





Departure


Departure


Impression:  


   Primary Impression:  


   Cellulitis of lower extremity


   Qualified Codes:  L03.116 - Cellulitis of left lower limb


   Additional Impression:  


   Insect bite of lower limb


   Qualified Codes:  S80.862A - Insect bite (nonvenomous), left lower leg, 

   initial encounter; W57.XXXA - Bitten or stung by nonvenomous insect and other

    nonvenomous arthropods, initial encounter


Disposition:  01 HOME / SELF CARE / HOMELESS


Condition:  STABLE


Referrals:  


CODIE STANFORD (PCP)


follow up in one week


Patient Instructions:  Insect Bite, Easy-to-Read





Additional Instructions:  


You have infected insect bite to the left shin.  Please use the antibiotics 

prescribed as ordered.  You can wash the area once or twice a day with regular 

soap and water.  Keep it open to air if it is not draining.  Follow-up with your

 primary care doctor in 1 to 2 weeks.  Come back to the ED at any point symptoms

 worsen


Scripts


Cephalexin (CEPHALEXIN) 500 Mg Tablet


1 TAB PO TID, #30 TAB


   Prov: BRADLEY OLVERA TERRA APRN         7/15/21 


Mupirocin (MUPIROCIN OINTMENT) 22 Gm Oint...g.


1 ANDREA TP TID for WOUND CARE, #1 EACH


   Prov: BRADLEY OLVERA APRN         7/15/21











BRADLEY OLVERA            Jul 15, 2021 12:21

## 2021-07-16 ENCOUNTER — HOSPITAL ENCOUNTER (EMERGENCY)
Dept: HOSPITAL 61 - ER | Age: 41
Discharge: HOME | End: 2021-07-16
Payer: COMMERCIAL

## 2021-07-16 VITALS — BODY MASS INDEX: 34.08 KG/M2 | HEIGHT: 62 IN | WEIGHT: 185.19 LBS

## 2021-07-16 VITALS — SYSTOLIC BLOOD PRESSURE: 133 MMHG | DIASTOLIC BLOOD PRESSURE: 83 MMHG

## 2021-07-16 DIAGNOSIS — J45.909: ICD-10-CM
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
## 2021-07-16 DIAGNOSIS — R51.9: ICD-10-CM
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
## 2021-07-16 DIAGNOSIS — F17.200: ICD-10-CM

## 2021-07-16 DIAGNOSIS — M54.2: ICD-10-CM

## 2021-07-16 DIAGNOSIS — E11.9: ICD-10-CM
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
## 2021-07-16 DIAGNOSIS — S86.811A: Primary | ICD-10-CM
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
## 2021-07-16 PROCEDURE — 73562 X-RAY EXAM OF KNEE 3: CPT

## 2021-07-16 PROCEDURE — 99283 EMERGENCY DEPT VISIT LOW MDM: CPT

## 2021-07-16 PROCEDURE — 81025 URINE PREGNANCY TEST: CPT
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
## 2022-01-21 ENCOUNTER — HOSPITAL ENCOUNTER (EMERGENCY)
Dept: HOSPITAL 61 - ER | Age: 42
Discharge: HOME | End: 2022-01-21
Payer: COMMERCIAL

## 2022-01-21 VITALS — SYSTOLIC BLOOD PRESSURE: 126 MMHG | DIASTOLIC BLOOD PRESSURE: 75 MMHG

## 2022-01-21 VITALS — WEIGHT: 179.24 LBS | HEIGHT: 62 IN | BODY MASS INDEX: 32.98 KG/M2

## 2022-01-21 DIAGNOSIS — Z88.1: ICD-10-CM

## 2022-01-21 DIAGNOSIS — E11.9: ICD-10-CM

## 2022-01-21 DIAGNOSIS — J45.909: ICD-10-CM

## 2022-01-21 DIAGNOSIS — Z98.51: ICD-10-CM

## 2022-01-21 DIAGNOSIS — K29.00: Primary | ICD-10-CM

## 2022-01-21 DIAGNOSIS — Z88.2: ICD-10-CM

## 2022-01-21 DIAGNOSIS — F17.200: ICD-10-CM

## 2022-01-21 LAB
ALBUMIN SERPL-MCNC: 3.3 G/DL (ref 3.4–5)
ALBUMIN/GLOB SERPL: 0.8 {RATIO} (ref 1–1.7)
ALP SERPL-CCNC: 138 U/L (ref 46–116)
ALT SERPL-CCNC: 18 U/L (ref 14–59)
ANION GAP SERPL CALC-SCNC: 11 MMOL/L (ref 6–14)
ANISOCYTOSIS BLD QL SMEAR: SLIGHT
APTT PPP: YELLOW S
AST SERPL-CCNC: 10 U/L (ref 15–37)
BACTERIA #/AREA URNS HPF: (no result) /HPF
BASOPHILS # BLD AUTO: 0 X10^3/UL (ref 0–0.2)
BASOPHILS NFR BLD: 1 % (ref 0–3)
BILIRUB SERPL-MCNC: 0.2 MG/DL (ref 0.2–1)
BILIRUB UR QL STRIP: NEGATIVE
BUN SERPL-MCNC: 10 MG/DL (ref 7–20)
BUN/CREAT SERPL: 14 (ref 6–20)
CALCIUM SERPL-MCNC: 8.3 MG/DL (ref 8.5–10.1)
CHLORIDE SERPL-SCNC: 104 MMOL/L (ref 98–107)
CO2 SERPL-SCNC: 25 MMOL/L (ref 21–32)
CREAT SERPL-MCNC: 0.7 MG/DL (ref 0.6–1)
DACRYOCYTES BLD QL SMEAR: (no result)
EOSINOPHIL NFR BLD: 0.1 X10^3/UL (ref 0–0.7)
EOSINOPHIL NFR BLD: 2 % (ref 0–3)
ERYTHROCYTE [DISTWIDTH] IN BLOOD BY AUTOMATED COUNT: 17.9 % (ref 11.5–14.5)
FIBRINOGEN PPP-MCNC: CLEAR MG/DL
GFR SERPLBLD BASED ON 1.73 SQ M-ARVRAT: 111 ML/MIN
GLUCOSE SERPL-MCNC: 237 MG/DL (ref 70–99)
HCT VFR BLD CALC: 31.1 % (ref 36–47)
HGB BLD-MCNC: 9.9 G/DL (ref 12–15.5)
HYPOCHROMIA BLD QL SMEAR: (no result)
LIPASE: 69 U/L (ref 73–393)
LYMPHOCYTES # BLD: 2 X10^3/UL (ref 1–4.8)
LYMPHOCYTES NFR BLD AUTO: 33 % (ref 24–48)
MCH RBC QN AUTO: 23 PG (ref 25–35)
MCHC RBC AUTO-ENTMCNC: 32 G/DL (ref 31–37)
MCV RBC AUTO: 71 FL (ref 79–100)
MICROCYTES BLD QL SMEAR: (no result)
MONO #: 0.5 X10^3/UL (ref 0–1.1)
MONOCYTES NFR BLD: 8 % (ref 0–9)
NEUT #: 3.4 X10^3/UL (ref 1.8–7.7)
NEUTROPHILS NFR BLD AUTO: 56 % (ref 31–73)
NITRITE UR QL STRIP: NEGATIVE
OVALOCYTES BLD QL SMEAR: (no result)
PH UR STRIP: 6.5 [PH]
PLATELET # BLD AUTO: 259 X10^3/UL (ref 140–400)
PLATELET # BLD EST: ADEQUATE 10*3/UL
POIKILOCYTOSIS BLD QL SMEAR: SLIGHT
POLYCHROMASIA BLD QL SMEAR: PRESENT
POTASSIUM SERPL-SCNC: 4.1 MMOL/L (ref 3.5–5.1)
PROT SERPL-MCNC: 7.7 G/DL (ref 6.4–8.2)
PROT UR STRIP-MCNC: NEGATIVE MG/DL
RBC # BLD AUTO: 4.4 X10^6/UL (ref 3.5–5.4)
RBC #/AREA URNS HPF: (no result) /HPF (ref 0–2)
SODIUM SERPL-SCNC: 140 MMOL/L (ref 136–145)
SPHEROCYTES BLD QL SMEAR: (no result)
UROBILINOGEN UR-MCNC: 0.2 MG/DL
WBC # BLD AUTO: 6.1 X10^3/UL (ref 4–11)
WBC #/AREA URNS HPF: (no result) /HPF (ref 0–4)

## 2022-01-21 PROCEDURE — 80053 COMPREHEN METABOLIC PANEL: CPT

## 2022-01-21 PROCEDURE — 83690 ASSAY OF LIPASE: CPT

## 2022-01-21 PROCEDURE — 81001 URINALYSIS AUTO W/SCOPE: CPT

## 2022-01-21 PROCEDURE — 96375 TX/PRO/DX INJ NEW DRUG ADDON: CPT

## 2022-01-21 PROCEDURE — 76705 ECHO EXAM OF ABDOMEN: CPT

## 2022-01-21 PROCEDURE — 96361 HYDRATE IV INFUSION ADD-ON: CPT

## 2022-01-21 PROCEDURE — 87086 URINE CULTURE/COLONY COUNT: CPT

## 2022-01-21 PROCEDURE — 87077 CULTURE AEROBIC IDENTIFY: CPT

## 2022-01-21 PROCEDURE — 87186 SC STD MICRODIL/AGAR DIL: CPT

## 2022-01-21 PROCEDURE — 36415 COLL VENOUS BLD VENIPUNCTURE: CPT

## 2022-01-21 PROCEDURE — 85025 COMPLETE CBC W/AUTO DIFF WBC: CPT

## 2022-01-21 PROCEDURE — 74177 CT ABD & PELVIS W/CONTRAST: CPT

## 2022-01-21 PROCEDURE — 99285 EMERGENCY DEPT VISIT HI MDM: CPT

## 2022-01-21 PROCEDURE — 81025 URINE PREGNANCY TEST: CPT

## 2022-01-21 PROCEDURE — 96374 THER/PROPH/DIAG INJ IV PUSH: CPT

## 2022-01-21 NOTE — PHYS DOC
Past Medical History


Past Medical History:  Asthma, Bronchitis, Diabetes-Type II


Additional Past Medical Histor:  vitamin D deficency


Past Surgical History:  , Tubal ligation, Other


Additional Past Surgical Histo:  eye surgery (strabismus correction)


Smoking Status:  Current Every Day Smoker


Additional Information:  


0.5 PPD


Alcohol Use:  None


Drug Use:  None





General Adult


EDM:


Chief Complaint:  ABDOMINAL PAIN





HPI:


HPI:





Patient is a 41 year old diabetic female who presents with 1 month history of 

right upper quadrant abdominal pain.  Patient describes her pain as discomfort 

that is associated with nausea and acidic belching that leaves a bad taste in 

her mouth.  She states that the pain is constant and radiates to her right side 

in her back.  Patient reports intermittent episodes of soft stool, but denies 

diarrhea.  She also denies emesis, chest pain, palpitations, edema, diaphoresis,

dysuria and hematuria.





Review of Systems:


Review of Systems:


Constitutional:  Denies fever, chills or generalized weakness


Eyes:  Denies change in visual acuity, visual field deficits or discharge


HENT:  Denies ear pain, nasal congestion or sore throat 


Respiratory:  Denies cough or shortness of breath 


Cardiovascular:  See HPI


GI:  See HPI


: See HPI


Musculoskeletal:  See HPI


Integument:  Denies rash or other skin lesion


Neurologic:  Denies headache, focal weakness or sensory changes





Heart Score:


C/O Chest Pain:  No





Current Medications:





Current Medications








 Medications


  (Trade)  Dose


 Ordered  Sig/Robb  Start Time


 Stop Time Status Last Admin


Dose Admin


 


 Diphenhydramine


 HCl


  (Benadryl)  25 mg  1X  ONCE  22 17:30


 22 17:31   





 


 Famotidine


  (Pepcid Vial)  20 mg  1X  ONCE  22 17:30


 22 17:31   





 


 Ondansetron HCl


  (Zofran)  4 mg  1X  ONCE  22 17:30


 22 17:31   





 


 Ringer's Solution  1,000 ml @ 


 1,000 mls/hr  1X  ONCE  22 17:30


 22 18:29   














Allergies:


Allergies:





Allergies








Coded Allergies Type Severity Reaction Last Updated Verified


 


  sulfamethoxazole Adverse Reaction Intermediate nausea and vomiting 3/9/21 Yes


 


  trimethoprim Adverse Reaction Intermediate nausea and vomiting 3/9/21 Yes











Physical Exam:


PE:





Constitutional: Well developed, well nourished, no acute distress, non-toxic 

appearance. 


HENT: Normocephalic, atraumatic, bilateral external ears normal, nose normal. 


Eyes: EOMI, conjunctiva normal, no discharge.  


Neck: Normal range of motion, no stridor.  


Cardiovascular: Heart rate regular rhythm, no murmur.


Lungs & Thorax: Bilateral breath sounds clear to auscultation, mildly diminished

throughout.


Abdomen: Bowel sounds normal, soft, right upper quadrant tenderness, positive 

Hernandez's sign, no masses, no pulsatile masses.  


Skin: Warm, dry, no erythema, no rash.  


Back: No step-off, no tenderness, no CVA tenderness.  


Extremities: No tenderness, no cyanosis, no clubbing, ROM intact, no edema.





Current Patient Data:


Labs:





Laboratory Tests








Test


 22


16:50 22


16:58 22


17:45


 


Urine Collection Type Void   


 


Urine Color Yellow   


 


Urine Clarity Clear   


 


Urine pH 6.5 (<5.0-8.0)   


 


Urine Specific Gravity


 >=1.030


(1.000-1.030) 


 





 


Urine Protein


 Negative mg/dL


(NEG-TRACE) 


 





 


Urine Glucose (UA)


 >=1000 mg/dL


(NEG) 


 





 


Urine Ketones (Stick)


 Negative mg/dL


(NEG) 


 





 


Urine Blood Negative (NEG)   


 


Urine Nitrite Negative (NEG)   


 


Urine Bilirubin Negative (NEG)   


 


Urine Urobilinogen Dipstick


 0.2 mg/dL (0.2


mg/dL) 


 





 


Urine Leukocyte Esterase Negative (NEG)   


 


Urine RBC 1-2 /HPF (0-2)   


 


Urine WBC


 20-40 /HPF


(0-4) 


 





 


Urine Squamous Epithelial


Cells Occ /LPF 


 


 





 


Urine Bacteria


 Few /HPF


(0-FEW) 


 





 


Bedside Urine HCG, Qualitative


 


 Hcg negative


(Negative) 





 


White Blood Count


 


 


 6.1 x10^3/uL


(4.0-11.0)


 


Red Blood Count


 


 


 4.40 x10^6/uL


(3.50-5.40)


 


Hemoglobin


 


 


 9.9 g/dL


(12.0-15.5)


 


Hematocrit


 


 


 31.1 %


(36.0-47.0)


 


Mean Corpuscular Volume   71 fL () 


 


Mean Corpuscular Hemoglobin   23 pg (25-35) 


 


Mean Corpuscular Hemoglobin


Concent 


 


 32 g/dL


(31-37)


 


Red Cell Distribution Width


 


 


 17.9 %


(11.5-14.5)


 


Platelet Count


 


 


 259 x10^3/uL


(140-400)


 


Neutrophils (%) (Auto)   56 % (31-73) 


 


Lymphocytes (%) (Auto)   33 % (24-48) 


 


Monocytes (%) (Auto)   8 % (0-9) 


 


Eosinophils (%) (Auto)   2 % (0-3) 


 


Basophils (%) (Auto)   1 % (0-3) 


 


Neutrophils # (Auto)


 


 


 3.4 x10^3/uL


(1.8-7.7)


 


Lymphocytes # (Auto)


 


 


 2.0 x10^3/uL


(1.0-4.8)


 


Monocytes # (Auto)


 


 


 0.5 x10^3/uL


(0.0-1.1)


 


Eosinophils # (Auto)


 


 


 0.1 x10^3/uL


(0.0-0.7)


 


Basophils # (Auto)


 


 


 0.0 x10^3/uL


(0.0-0.2)


 


Platelet Estimate


 


 


 Adequate


(ADEQUATE)


 


Polychromasia   Present 


 


Hypochromasia   Mod 


 


Poikilocytosis   Slight 


 


Anisocytosis   Slight 


 


Microcytosis   Mod 


 


Spherocytes   Few 


 


Tear Drop Cells   Occ 


 


Ovalocytes   Few 


 


Sodium Level


 


 


 140 mmol/L


(136-145)


 


Potassium Level


 


 


 4.1 mmol/L


(3.5-5.1)


 


Chloride Level


 


 


 104 mmol/L


()


 


Carbon Dioxide Level


 


 


 25 mmol/L


(21-32)


 


Anion Gap   11 (6-14) 


 


Blood Urea Nitrogen


 


 


 10 mg/dL


(7-20)


 


Creatinine


 


 


 0.7 mg/dL


(0.6-1.0)


 


Estimated GFR


(Cockcroft-Gault) 


 


 111.0 





 


BUN/Creatinine Ratio   14 (6-20) 


 


Glucose Level


 


 


 237 mg/dL


(70-99)


 


Calcium Level


 


 


 8.3 mg/dL


(8.5-10.1)


 


Total Bilirubin


 


 


 0.2 mg/dL


(0.2-1.0)


 


Aspartate Amino Transf


(AST/SGOT) 


 


 10 U/L (15-37) 





 


Alanine Aminotransferase


(ALT/SGPT) 


 


 18 U/L (14-59) 





 


Alkaline Phosphatase


 


 


 138 U/L


()


 


Total Protein


 


 


 7.7 g/dL


(6.4-8.2)


 


Albumin


 


 


 3.3 g/dL


(3.4-5.0)


 


Albumin/Globulin Ratio   0.8 (1.0-1.7) 


 


Lipase


 


 


 69 U/L


()








Vital Signs:





                                   Vital Signs








  Date Time  Temp Pulse Resp B/P (MAP) Pulse Ox O2 Delivery O2 Flow Rate FiO2


 


22 16:48 98.9 90 17 131/77 (95)    





 98.9       











Radiology/Procedures:


Radiology/Procedures:


PROCEDURE: CT ABD PELV W/ IV CONTRST ONLY





Exam: CT abdomen/pelvis with intravenous contrast





Indication: Right upper quadrant pain, nausea





Comparison: CTA chest abdomen pelvis 2019





Technique: Helical CT imaging performed of the abdomen and pelvis after the 

intravenous administration of 75 mL Omnipaque 300 contrast. Sagittal and coronal

reformats were obtained. 





One or more of the following individualized dose reduction techniques were 

utilized for this examination:  





1. Automated exposure control


2. Adjustment of the mA and/or kV according to patient size


3. Use of iterative reconstruction technique.





Findings:





Lower chest: The heart is normal in size. Lung bases are clear.





Liver: Normal. No focal lesion.





Gallbladder/Biliary Tree: Normal.





Pancreas: Normal.





Spleen: Normal.





Adrenal Glands: Normal.





Kidneys/Ureters/Bladder: Kidneys are normal in size and enhance symmetrically. 

No hydronephrosis. Ureters and bladder are normal.





Reproductive Organs: Uterus is anteverted. Unchanged 3.3 cm right ovarian cyst. 

The left ovary is normal.





Stomach, small bowel, and colon: The stomach is mildly distended with debris. 

There is no small bowel obstruction. The appendix is normal. The colon is 

normal.





Vasculature: Abdominal aortic aneurysm.





Lymph Nodes: No lymphadenopathy.





Peritoneum and retroperitoneum: Trace free fluid in the pelvis. No free air.





Bones: No acute osseous abnormality.








IMPRESSION:





1.  No acute abnormality in the abdomen and pelvis.


2.  Mildly distended stomach containing debris.


3.  Unchanged 3.3 cm right ovarian cyst.


4.  Trace free fluid in the pelvis.





Electronically signed by: Vicky Akhtar MD (2022 6:51 PM) [id number]





PROCEDURE: ABDOMEN LTD





EXAMINATION: US ABDOMEN LIMITED 2022 7:45 PM





INDICATION: Right upper quadrant pain, nausea





TECHNIQUE: Gray scale and color Doppler ultrasound images of the right upper 

quadrant were obtained.





COMPARISON: CT abdomen pelvis same day.





FINDINGS:





Liver: The liver is normal in size measuring 16.8 cm in length.  Normal hepatic 

echogenicity.  No focal liver lesion.





Gallbladder:  The gallbladder is normal in caliber.  No cholelithiasis or 

sludge.  The gallbladder wall is normal in thickness measuring 2 mm.  





Bile ducts: The common bile duct is normal measuring 3 mm.  No  intrahepatic 

biliary duct dilatation.





Right kidney: The right kidney measures 11.9 x 4.8 x 4.2 cm.  Normal cortical 

thickness and echogenicity.  No hydronephrosis.





Other: Inferior vena cava is normal where visualized.  The pancreas is obscured.





IMPRESSION: Unremarkable right upper quadrant ultrasound.





Electronically signed by: Vicky Akhtar MD (2022 8:40 PM) [id number]





Course & Med Decision Making:


Course & Med Decision Making


Pertinent Labs and Imaging studies reviewed. (See chart for details)





Patient is a 41-year-old female who presents with right upper quadrant pain, na

usea and acidic belching for the past month.  Patient symptoms and exam 

concerning for possible gallbladder pathology, hepatic pathology, 

gastritis/GERD/gastric ulcer.  Work-up today will include labs, abdominal CT 

with IV contrast, urinalysis.  Patient provided with IV Zofran and Pepcid.





On reevaluation, patient states that her nausea is much improved.  She continues

to experience right upper quadrant abdominal discomfort.  Informed patient that 

CT was negative for any acute findings and that the ovarian cysts is unchanged. 

Right upper quadrant abdominal ultrasound ordered for further evaluation of 

gallbladder.





Ultrasound also negative for any acute findings.  Informed patient that the 

etiology of her pain is likely related to her stomach and possible GERD 

symptoms.  Advised patient to follow-up with gastroenterology for further 

evaluation and management.  Contact information for Dr. Shine was provided.  

Prescription sent electronically for Pepcid and Zofran.  Patient is advised to 

use over-the-counter antacids as needed for acidic belching 2 hours after taking

Pepcid, should symptoms persist.  Tylenol is preferable to NSAIDS for pain in 

setting of GI upset. Patient understands and is agreeable to discharge plan.





Dragon Disclaimer:


Dragon Disclaimer:


This electronic medical record was generated, in whole or in part, using a voice

recognition dictation system.





Departure


Departure


Impression:  


   Primary Impression:  


   Gastritis


   Qualified Codes:  K29.00 - Acute gastritis without bleeding


   Additional Impression:  


   Upper abdominal pain, unspecified


Disposition:   HOME / SELF CARE / HOMELESS


Condition:  IMPROVED


Referrals:  


CODIE STANFORD (PCP)








NOAH SHINE MD


Patient Instructions:  Abdominal Pain, Easy-to-Read, Gastritis, Adult, 

Easy-to-Read





Additional Instructions:  


EMERGENCY DEPARTMENT GENERAL DISCHARGE INSTRUCTIONS





Thank you for coming to [facility] Emergency Department (ED) 

today and trusting us with you care.  We trust that you had a positive 

experience in our Emergency Department.  If you wish to speak to the department 

management, you may call the director at [phone].





YOUR FOLLOW UP INSTRUCTIONS ARE AS FOLLOWS:


1.  Follow up with your primary care doctor and the specialist listed above, Dr. Shine. If you do not have a primary doctor, please ask for a resource list of 

physicians or clinics that may be able to assist you with follow up care.


2.  The emergency provider has interpreted your imaging studies, if any were 

ordered.  The radiology imaging specialist also reviewed them.  If there is a 

change in the findings, you will be notified in 48 hours when at all possible.


3.  If a lab test or culture has been done, your results will be reviewed and 

you will be notified if you need a change in treatment.


4.  Follow instructions verbalized to you and refer to the printouts if needed.





ADDITIONAL INSTRUCTIONS AND INFORMATION:


1.  Your care today has been supervised by a physician who is specially trained 

in emergency care.  Many problems require more than one evaluation for a 

complete diagnosis and treatment.  We recommend that you schedule your follow up

appointment as recommended to ensure complete treatment of you illness or 

injury.  If you are unable to obtain follow up care and continue to have a 

problem, or if your condition worsens, we recommend that you return to the ED.


2.  We are not able to safely determine your condition over the phone nor are we

able to give sound medical advice over the phone.  For these safety reasons, if 

you call for medical advice we will ask you to come to the ED for further 

evaluation.


3.  If you have any questions regarding these discharge instructions please call

the ED at [phone].





SAFETY INFORMATION:


In the interest of safety, wellness, and injury prevention; we encourage you to 

wear your seat belt, if you smoke; quite smoking, and we encourage family to use

a protective helmet for bicycling and other sporting events that present an 

increased risk for head injury.





IF YOUR SYMPTOMS WORSEN OR NEW SYMPTOMS DEVELOP, OR YOU HAVE CONCERNS ABOUT YOUR

CONDITION; OR IF YOUR CONDITION WORSENS WHILE YOU ARE WAITING FOR YOUR FOLLOW UP

APPOINTMENT; EITHER CONTACT YOUR PRIMARY CARE DOCTOR, THE PHYSICIAN WHOSE NAME 

AND NUMBER YOU WERE GIVEN, OR RETURN TO THE ED IMMEDIATELY.


Scripts


Famotidine (PEPCID) 20 Mg Tablet


20 MG PO BID for 10 Days, #20 TAB


   Prov: SARAH SHRESTHA         22 


Ondansetron (ONDANSETRON ODT) 4 Mg Tab.rapdis


1 TAB PO PRN Q6-8HRS for N/V, #20 TAB


   Prov: SARAH SHRESTHA         22











SARAH SHRESTHA              2022 17:38

## 2022-01-21 NOTE — RAD
Exam: CT abdomen/pelvis with intravenous contrast



Indication: Right upper quadrant pain, nausea



Comparison: CTA chest abdomen pelvis 8/5/2019



Technique: Helical CT imaging performed of the abdomen and pelvis after the intravenous administratio
n of 75 mL Omnipaque 300 contrast. Sagittal and coronal reformats were obtained. 



One or more of the following individualized dose reduction techniques were utilized for this examinat
ion:  



1. Automated exposure control

2. Adjustment of the mA and/or kV according to patient size

3. Use of iterative reconstruction technique.



Findings:



Lower chest: The heart is normal in size. Lung bases are clear.



Liver: Normal. No focal lesion.



Gallbladder/Biliary Tree: Normal.



Pancreas: Normal.



Spleen: Normal.



Adrenal Glands: Normal.



Kidneys/Ureters/Bladder: Kidneys are normal in size and enhance symmetrically. No hydronephrosis. Ure
ters and bladder are normal.



Reproductive Organs: Uterus is anteverted. Unchanged 3.3 cm right ovarian cyst. The left ovary is nor
mal.



Stomach, small bowel, and colon: The stomach is mildly distended with debris. There is no small bowel
 obstruction. The appendix is normal. The colon is normal.



Vasculature: Abdominal aortic aneurysm.



Lymph Nodes: No lymphadenopathy.



Peritoneum and retroperitoneum: Trace free fluid in the pelvis. No free air.



Bones: No acute osseous abnormality.





IMPRESSION:



1.  No acute abnormality in the abdomen and pelvis.

2.  Mildly distended stomach containing debris.

3.  Unchanged 3.3 cm right ovarian cyst.

4.  Trace free fluid in the pelvis.



Electronically signed by: Vicky Akhtar MD (1/21/2022 6:51 PM) [id number]

## 2022-01-21 NOTE — RAD
EXAMINATION: US ABDOMEN LIMITED 1/21/2022 7:45 PM



INDICATION: Right upper quadrant pain, nausea



TECHNIQUE: Gray scale and color Doppler ultrasound images of the right upper quadrant were obtained.



COMPARISON: CT abdomen pelvis same day.



FINDINGS:



Liver: The liver is normal in size measuring 16.8 cm in length.  Normal hepatic echogenicity.  No foc
al liver lesion.



Gallbladder:  The gallbladder is normal in caliber.  No cholelithiasis or sludge.  The gallbladder wa
ll is normal in thickness measuring 2 mm.  



Bile ducts: The common bile duct is normal measuring 3 mm.  No  intrahepatic biliary duct dilatation.




Right kidney: The right kidney measures 11.9 x 4.8 x 4.2 cm.  Normal cortical thickness and echogenic
ity.  No hydronephrosis.



Other: Inferior vena cava is normal where visualized.  The pancreas is obscured.



IMPRESSION: Unremarkable right upper quadrant ultrasound.



Electronically signed by: Vicky Akhtar MD (1/21/2022 8:40 PM) [id number]